# Patient Record
Sex: MALE | Race: WHITE | URBAN - METROPOLITAN AREA
[De-identification: names, ages, dates, MRNs, and addresses within clinical notes are randomized per-mention and may not be internally consistent; named-entity substitution may affect disease eponyms.]

---

## 2017-01-01 ENCOUNTER — APPOINTMENT (OUTPATIENT)
Dept: CT IMAGING | Facility: CLINIC | Age: 47
End: 2017-01-01
Attending: EMERGENCY MEDICINE
Payer: COMMERCIAL

## 2017-01-01 ENCOUNTER — HOSPITAL ENCOUNTER (EMERGENCY)
Facility: CLINIC | Age: 47
Discharge: HOME | End: 2017-08-26
Attending: EMERGENCY MEDICINE | Admitting: EMERGENCY MEDICINE
Payer: COMMERCIAL

## 2017-01-01 ENCOUNTER — HOSPITAL ENCOUNTER (INPATIENT)
Facility: CLINIC | Age: 47
LOS: 2 days | Discharge: HOME | End: 2017-10-27
Attending: EMERGENCY MEDICINE | Admitting: INTERNAL MEDICINE
Payer: COMMERCIAL

## 2017-01-01 ENCOUNTER — OFFICE VISIT (OUTPATIENT)
Dept: INTERNAL MEDICINE | Facility: CLINIC | Age: 47
End: 2017-01-01
Payer: COMMERCIAL

## 2017-01-01 ENCOUNTER — TELEPHONE (OUTPATIENT)
Dept: INTERNAL MEDICINE | Facility: CLINIC | Age: 47
End: 2017-01-01

## 2017-01-01 VITALS
OXYGEN SATURATION: 97 % | HEART RATE: 106 BPM | TEMPERATURE: 97.3 F | DIASTOLIC BLOOD PRESSURE: 94 MMHG | SYSTOLIC BLOOD PRESSURE: 139 MMHG | RESPIRATION RATE: 16 BRPM

## 2017-01-01 VITALS
HEIGHT: 68 IN | OXYGEN SATURATION: 98 % | HEART RATE: 82 BPM | DIASTOLIC BLOOD PRESSURE: 70 MMHG | WEIGHT: 201 LBS | TEMPERATURE: 98.2 F | SYSTOLIC BLOOD PRESSURE: 118 MMHG | BODY MASS INDEX: 30.46 KG/M2

## 2017-01-01 VITALS
DIASTOLIC BLOOD PRESSURE: 76 MMHG | SYSTOLIC BLOOD PRESSURE: 124 MMHG | TEMPERATURE: 98.2 F | BODY MASS INDEX: 30.31 KG/M2 | OXYGEN SATURATION: 99 % | HEART RATE: 78 BPM | HEIGHT: 68 IN | WEIGHT: 200 LBS

## 2017-01-01 VITALS
BODY MASS INDEX: 27.6 KG/M2 | RESPIRATION RATE: 20 BRPM | DIASTOLIC BLOOD PRESSURE: 101 MMHG | TEMPERATURE: 98.4 F | HEIGHT: 68 IN | WEIGHT: 182.1 LBS | SYSTOLIC BLOOD PRESSURE: 160 MMHG | HEART RATE: 96 BPM | OXYGEN SATURATION: 97 %

## 2017-01-01 DIAGNOSIS — D69.6 THROMBOCYTOPENIA (H): ICD-10-CM

## 2017-01-01 DIAGNOSIS — C92.10 CHRONIC MYELOID LEUKEMIA (H): Primary | ICD-10-CM

## 2017-01-01 DIAGNOSIS — R56.9 SEIZURES (H): ICD-10-CM

## 2017-01-01 DIAGNOSIS — R11.2 NAUSEA AND VOMITING, INTRACTABILITY OF VOMITING NOT SPECIFIED, UNSPECIFIED VOMITING TYPE: ICD-10-CM

## 2017-01-01 DIAGNOSIS — Z00.00 ROUTINE HISTORY AND PHYSICAL EXAMINATION OF ADULT: Primary | ICD-10-CM

## 2017-01-01 DIAGNOSIS — E83.42 HYPOMAGNESEMIA: ICD-10-CM

## 2017-01-01 DIAGNOSIS — Z87.39 HISTORY OF GOUT: ICD-10-CM

## 2017-01-01 DIAGNOSIS — C92.10 CHRONIC MYELOID LEUKEMIA (H): ICD-10-CM

## 2017-01-01 DIAGNOSIS — L71.9 ROSACEA: ICD-10-CM

## 2017-01-01 DIAGNOSIS — G40.909 RECURRENT SEIZURES (H): ICD-10-CM

## 2017-01-01 DIAGNOSIS — R60.0 LEG EDEMA: ICD-10-CM

## 2017-01-01 DIAGNOSIS — E87.6 HYPOKALEMIA: ICD-10-CM

## 2017-01-01 DIAGNOSIS — F10.932 ALCOHOL WITHDRAWAL, WITH PERCEPTUAL DISTURBANCE (H): ICD-10-CM

## 2017-01-01 LAB
ALBUMIN SERPL-MCNC: 3.5 G/DL (ref 3.4–5)
ALBUMIN SERPL-MCNC: 3.9 G/DL (ref 3.4–5)
ALBUMIN UR-MCNC: 100 MG/DL
ALBUMIN UR-MCNC: NEGATIVE MG/DL
ALBUMIN UR-MCNC: NEGATIVE MG/DL
ALP SERPL-CCNC: 49 U/L (ref 40–150)
ALP SERPL-CCNC: 62 U/L (ref 40–150)
ALT SERPL W P-5'-P-CCNC: 25 U/L (ref 0–70)
ALT SERPL W P-5'-P-CCNC: 27 U/L (ref 0–70)
AMORPH CRY #/AREA URNS HPF: ABNORMAL /HPF
ANION GAP SERPL CALCULATED.3IONS-SCNC: 11 MMOL/L (ref 3–14)
ANION GAP SERPL CALCULATED.3IONS-SCNC: 15 MMOL/L (ref 3–14)
ANION GAP SERPL CALCULATED.3IONS-SCNC: 17 MMOL/L (ref 3–14)
ANION GAP SERPL CALCULATED.3IONS-SCNC: 6 MMOL/L (ref 3–14)
ANION GAP SERPL CALCULATED.3IONS-SCNC: 6 MMOL/L (ref 3–14)
ANION GAP SERPL CALCULATED.3IONS-SCNC: 8 MMOL/L (ref 3–14)
APPEARANCE UR: ABNORMAL
APPEARANCE UR: ABNORMAL
APPEARANCE UR: CLEAR
AST SERPL W P-5'-P-CCNC: 21 U/L (ref 0–45)
AST SERPL W P-5'-P-CCNC: 25 U/L (ref 0–45)
BASE EXCESS BLDV CALC-SCNC: 0.1 MMOL/L
BASOPHILS # BLD AUTO: 0 10E9/L (ref 0–0.2)
BASOPHILS # BLD AUTO: 0.1 10E9/L (ref 0–0.2)
BASOPHILS # BLD AUTO: 0.1 10E9/L (ref 0–0.2)
BASOPHILS NFR BLD AUTO: 0.4 %
BASOPHILS NFR BLD AUTO: 0.4 %
BASOPHILS NFR BLD AUTO: 0.6 %
BASOPHILS NFR BLD AUTO: 0.9 %
BASOPHILS NFR BLD AUTO: 1 %
BILIRUB SERPL-MCNC: 0.6 MG/DL (ref 0.2–1.3)
BILIRUB SERPL-MCNC: 2.2 MG/DL (ref 0.2–1.3)
BILIRUB UR QL STRIP: ABNORMAL
BILIRUB UR QL STRIP: NEGATIVE
BILIRUB UR QL STRIP: NEGATIVE
BUN SERPL-MCNC: 10 MG/DL (ref 7–30)
BUN SERPL-MCNC: 11 MG/DL (ref 7–30)
BUN SERPL-MCNC: 6 MG/DL (ref 7–30)
BUN SERPL-MCNC: 6 MG/DL (ref 7–30)
BUN SERPL-MCNC: 7 MG/DL (ref 7–30)
BUN SERPL-MCNC: 8 MG/DL (ref 7–30)
C DIFF TOX B STL QL: NEGATIVE
CALCIUM SERPL-MCNC: 8.1 MG/DL (ref 8.5–10.1)
CALCIUM SERPL-MCNC: 8.1 MG/DL (ref 8.5–10.1)
CALCIUM SERPL-MCNC: 8.6 MG/DL (ref 8.5–10.1)
CALCIUM SERPL-MCNC: 8.7 MG/DL (ref 8.5–10.1)
CALCIUM SERPL-MCNC: 8.7 MG/DL (ref 8.5–10.1)
CALCIUM SERPL-MCNC: 9.1 MG/DL (ref 8.5–10.1)
CHLORIDE SERPL-SCNC: 103 MMOL/L (ref 94–109)
CHLORIDE SERPL-SCNC: 105 MMOL/L (ref 94–109)
CHLORIDE SERPL-SCNC: 106 MMOL/L (ref 94–109)
CHLORIDE SERPL-SCNC: 107 MMOL/L (ref 94–109)
CHLORIDE SERPL-SCNC: 109 MMOL/L (ref 94–109)
CHLORIDE SERPL-SCNC: 96 MMOL/L (ref 94–109)
CHOLEST SERPL-MCNC: 175 MG/DL
CO2 SERPL-SCNC: 18 MMOL/L (ref 20–32)
CO2 SERPL-SCNC: 23 MMOL/L (ref 20–32)
CO2 SERPL-SCNC: 24 MMOL/L (ref 20–32)
CO2 SERPL-SCNC: 25 MMOL/L (ref 20–32)
CO2 SERPL-SCNC: 26 MMOL/L (ref 20–32)
CO2 SERPL-SCNC: 28 MMOL/L (ref 20–32)
COLOR UR AUTO: ABNORMAL
COLOR UR AUTO: YELLOW
COLOR UR AUTO: YELLOW
CREAT SERPL-MCNC: 0.61 MG/DL (ref 0.66–1.25)
CREAT SERPL-MCNC: 0.62 MG/DL (ref 0.66–1.25)
CREAT SERPL-MCNC: 0.71 MG/DL (ref 0.66–1.25)
CREAT SERPL-MCNC: 0.75 MG/DL (ref 0.66–1.25)
CREAT SERPL-MCNC: 0.82 MG/DL (ref 0.66–1.25)
CREAT SERPL-MCNC: 0.89 MG/DL (ref 0.66–1.25)
DIFFERENTIAL METHOD BLD: ABNORMAL
EOSINOPHIL # BLD AUTO: 0 10E9/L (ref 0–0.7)
EOSINOPHIL # BLD AUTO: 0 10E9/L (ref 0–0.7)
EOSINOPHIL # BLD AUTO: 0.1 10E9/L (ref 0–0.7)
EOSINOPHIL # BLD AUTO: 0.2 10E9/L (ref 0–0.7)
EOSINOPHIL # BLD AUTO: 0.2 10E9/L (ref 0–0.7)
EOSINOPHIL NFR BLD AUTO: 0 %
EOSINOPHIL NFR BLD AUTO: 0.3 %
EOSINOPHIL NFR BLD AUTO: 2.2 %
EOSINOPHIL NFR BLD AUTO: 4 %
EOSINOPHIL NFR BLD AUTO: 4.1 %
ERYTHROCYTE [DISTWIDTH] IN BLOOD BY AUTOMATED COUNT: 12.8 % (ref 10–15)
ERYTHROCYTE [DISTWIDTH] IN BLOOD BY AUTOMATED COUNT: 12.8 % (ref 10–15)
ERYTHROCYTE [DISTWIDTH] IN BLOOD BY AUTOMATED COUNT: 12.9 % (ref 10–15)
ERYTHROCYTE [DISTWIDTH] IN BLOOD BY AUTOMATED COUNT: 13 % (ref 10–15)
ERYTHROCYTE [DISTWIDTH] IN BLOOD BY AUTOMATED COUNT: 13.6 % (ref 10–15)
ETHANOL SERPL-MCNC: <0.01 G/DL
GFR SERPL CREATININE-BSD FRML MDRD: >90 ML/MIN/1.7M2
GLUCOSE BLDC GLUCOMTR-MCNC: 108 MG/DL (ref 70–99)
GLUCOSE BLDC GLUCOMTR-MCNC: 119 MG/DL (ref 70–99)
GLUCOSE BLDC GLUCOMTR-MCNC: 164 MG/DL (ref 70–99)
GLUCOSE BLDC GLUCOMTR-MCNC: 190 MG/DL (ref 70–99)
GLUCOSE BLDC GLUCOMTR-MCNC: 198 MG/DL (ref 70–99)
GLUCOSE SERPL-MCNC: 106 MG/DL (ref 70–99)
GLUCOSE SERPL-MCNC: 110 MG/DL (ref 70–99)
GLUCOSE SERPL-MCNC: 152 MG/DL (ref 70–99)
GLUCOSE SERPL-MCNC: 176 MG/DL (ref 70–99)
GLUCOSE SERPL-MCNC: 196 MG/DL (ref 70–99)
GLUCOSE SERPL-MCNC: 198 MG/DL (ref 70–99)
GLUCOSE UR STRIP-MCNC: 150 MG/DL
GLUCOSE UR STRIP-MCNC: NEGATIVE MG/DL
GLUCOSE UR STRIP-MCNC: NEGATIVE MG/DL
HCO3 BLDV-SCNC: 23 MMOL/L (ref 21–28)
HCT VFR BLD AUTO: 35.6 % (ref 40–53)
HCT VFR BLD AUTO: 36.7 % (ref 40–53)
HCT VFR BLD AUTO: 37 % (ref 40–53)
HCT VFR BLD AUTO: 39.2 % (ref 40–53)
HCT VFR BLD AUTO: 42.2 % (ref 40–53)
HDLC SERPL-MCNC: 38 MG/DL
HGB BLD-MCNC: 12.3 G/DL (ref 13.3–17.7)
HGB BLD-MCNC: 12.3 G/DL (ref 13.3–17.7)
HGB BLD-MCNC: 12.4 G/DL (ref 13.3–17.7)
HGB BLD-MCNC: 13.7 G/DL (ref 13.3–17.7)
HGB BLD-MCNC: 15 G/DL (ref 13.3–17.7)
HGB UR QL STRIP: ABNORMAL
HGB UR QL STRIP: ABNORMAL
HGB UR QL STRIP: NEGATIVE
HYALINE CASTS #/AREA URNS LPF: 25 /LPF (ref 0–2)
HYALINE CASTS #/AREA URNS LPF: 4 /LPF (ref 0–2)
IMM GRANULOCYTES # BLD: 0 10E9/L (ref 0–0.4)
IMM GRANULOCYTES # BLD: 0.1 10E9/L (ref 0–0.4)
IMM GRANULOCYTES NFR BLD: 0.2 %
IMM GRANULOCYTES NFR BLD: 0.3 %
IMM GRANULOCYTES NFR BLD: 0.3 %
IMM GRANULOCYTES NFR BLD: 1.1 %
INR PPP: 1.28 (ref 0.86–1.14)
INTERPRETATION ECG - MUSE: NORMAL
KETONES UR STRIP-MCNC: 20 MG/DL
KETONES UR STRIP-MCNC: 20 MG/DL
KETONES UR STRIP-MCNC: NEGATIVE MG/DL
LACTATE BLD-SCNC: 1.5 MMOL/L (ref 0.7–2)
LACTATE BLD-SCNC: 4 MMOL/L (ref 0.7–2)
LACTATE BLD-SCNC: 6.4 MMOL/L (ref 0.7–2)
LDLC SERPL CALC-MCNC: 106 MG/DL
LEUKOCYTE ESTERASE UR QL STRIP: NEGATIVE
LEVETIRACETAM SERPL-MCNC: 8 UG/ML (ref 12–46)
LEVETIRACETAM SERPL-MCNC: <2 UG/ML (ref 12–46)
LYMPHOCYTES # BLD AUTO: 0.2 10E9/L (ref 0.8–5.3)
LYMPHOCYTES # BLD AUTO: 0.7 10E9/L (ref 0.8–5.3)
LYMPHOCYTES # BLD AUTO: 0.8 10E9/L (ref 0.8–5.3)
LYMPHOCYTES # BLD AUTO: 1 10E9/L (ref 0.8–5.3)
LYMPHOCYTES # BLD AUTO: 1.4 10E9/L (ref 0.8–5.3)
LYMPHOCYTES NFR BLD AUTO: 1.5 %
LYMPHOCYTES NFR BLD AUTO: 10.6 %
LYMPHOCYTES NFR BLD AUTO: 15.8 %
LYMPHOCYTES NFR BLD AUTO: 17.5 %
LYMPHOCYTES NFR BLD AUTO: 21 %
MAGNESIUM SERPL-MCNC: 1.4 MG/DL (ref 1.6–2.3)
MAGNESIUM SERPL-MCNC: 1.7 MG/DL (ref 1.6–2.3)
MAGNESIUM SERPL-MCNC: 3.2 MG/DL (ref 1.6–2.3)
MCH RBC QN AUTO: 35.1 PG (ref 26.5–33)
MCH RBC QN AUTO: 35.3 PG (ref 26.5–33)
MCH RBC QN AUTO: 35.4 PG (ref 26.5–33)
MCH RBC QN AUTO: 35.5 PG (ref 26.5–33)
MCH RBC QN AUTO: 35.7 PG (ref 26.5–33)
MCHC RBC AUTO-ENTMCNC: 33.5 G/DL (ref 31.5–36.5)
MCHC RBC AUTO-ENTMCNC: 33.5 G/DL (ref 31.5–36.5)
MCHC RBC AUTO-ENTMCNC: 34.6 G/DL (ref 31.5–36.5)
MCHC RBC AUTO-ENTMCNC: 34.9 G/DL (ref 31.5–36.5)
MCHC RBC AUTO-ENTMCNC: 35.5 G/DL (ref 31.5–36.5)
MCV RBC AUTO: 100 FL (ref 78–100)
MCV RBC AUTO: 101 FL (ref 78–100)
MCV RBC AUTO: 103 FL (ref 78–100)
MCV RBC AUTO: 105 FL (ref 78–100)
MCV RBC AUTO: 107 FL (ref 78–100)
MONOCYTES # BLD AUTO: 0.3 10E9/L (ref 0–1.3)
MONOCYTES # BLD AUTO: 0.5 10E9/L (ref 0–1.3)
MONOCYTES # BLD AUTO: 0.5 10E9/L (ref 0–1.3)
MONOCYTES # BLD AUTO: 0.6 10E9/L (ref 0–1.3)
MONOCYTES # BLD AUTO: 1.3 10E9/L (ref 0–1.3)
MONOCYTES NFR BLD AUTO: 11.2 %
MONOCYTES NFR BLD AUTO: 4.6 %
MONOCYTES NFR BLD AUTO: 8 %
MONOCYTES NFR BLD AUTO: 9.8 %
MONOCYTES NFR BLD AUTO: 9.8 %
MRSA DNA SPEC QL NAA+PROBE: NEGATIVE
MUCOUS THREADS #/AREA URNS LPF: PRESENT /LPF
MUCOUS THREADS #/AREA URNS LPF: PRESENT /LPF
NEUTROPHILS # BLD AUTO: 10 10E9/L (ref 1.6–8.3)
NEUTROPHILS # BLD AUTO: 10 10E9/L (ref 1.6–8.3)
NEUTROPHILS # BLD AUTO: 2.5 10E9/L (ref 1.6–8.3)
NEUTROPHILS # BLD AUTO: 3.3 10E9/L (ref 1.6–8.3)
NEUTROPHILS # BLD AUTO: 3.7 10E9/L (ref 1.6–8.3)
NEUTROPHILS NFR BLD AUTO: 66.4 %
NEUTROPHILS NFR BLD AUTO: 67.9 %
NEUTROPHILS NFR BLD AUTO: 69.7 %
NEUTROPHILS NFR BLD AUTO: 77.2 %
NEUTROPHILS NFR BLD AUTO: 93.2 %
NITRATE UR QL: NEGATIVE
NONHDLC SERPL-MCNC: 137 MG/DL
NRBC # BLD AUTO: 0 10*3/UL
NRBC BLD AUTO-RTO: 0 /100
O2/TOTAL GAS SETTING VFR VENT: ABNORMAL %
OXYHGB MFR BLDV: 87 %
PCO2 BLDV: 33 MM HG (ref 40–50)
PH BLDV: 7.46 PH (ref 7.32–7.43)
PH UR STRIP: 5 PH (ref 5–7)
PH UR STRIP: 6 PH (ref 5–7)
PH UR STRIP: 6.5 PH (ref 5–7)
PLATELET # BLD AUTO: 120 10E9/L (ref 150–450)
PLATELET # BLD AUTO: 167 10E9/L (ref 150–450)
PLATELET # BLD AUTO: 194 10E9/L (ref 150–450)
PLATELET # BLD AUTO: 86 10E9/L (ref 150–450)
PLATELET # BLD AUTO: 90 10E9/L (ref 150–450)
PO2 BLDV: 51 MM HG (ref 25–47)
POTASSIUM SERPL-SCNC: 2.7 MMOL/L (ref 3.4–5.3)
POTASSIUM SERPL-SCNC: 2.9 MMOL/L (ref 3.4–5.3)
POTASSIUM SERPL-SCNC: 3.4 MMOL/L (ref 3.4–5.3)
POTASSIUM SERPL-SCNC: 3.6 MMOL/L (ref 3.4–5.3)
POTASSIUM SERPL-SCNC: 3.7 MMOL/L (ref 3.4–5.3)
POTASSIUM SERPL-SCNC: 3.8 MMOL/L (ref 3.4–5.3)
POTASSIUM SERPL-SCNC: 3.9 MMOL/L (ref 3.4–5.3)
PROT SERPL-MCNC: 6.8 G/DL (ref 6.8–8.8)
PROT SERPL-MCNC: 7.1 G/DL (ref 6.8–8.8)
PSA SERPL-ACNC: 0.78 UG/L (ref 0–4)
RBC # BLD AUTO: 3.46 10E12/L (ref 4.4–5.9)
RBC # BLD AUTO: 3.47 10E12/L (ref 4.4–5.9)
RBC # BLD AUTO: 3.5 10E12/L (ref 4.4–5.9)
RBC # BLD AUTO: 3.88 10E12/L (ref 4.4–5.9)
RBC # BLD AUTO: 4.24 10E12/L (ref 4.4–5.9)
RBC #/AREA URNS AUTO: 1 /HPF (ref 0–2)
RBC #/AREA URNS AUTO: 20 /HPF (ref 0–2)
RBC #/AREA URNS AUTO: NORMAL /HPF
SODIUM SERPL-SCNC: 135 MMOL/L (ref 133–144)
SODIUM SERPL-SCNC: 135 MMOL/L (ref 133–144)
SODIUM SERPL-SCNC: 139 MMOL/L (ref 133–144)
SODIUM SERPL-SCNC: 140 MMOL/L (ref 133–144)
SODIUM SERPL-SCNC: 140 MMOL/L (ref 133–144)
SODIUM SERPL-SCNC: 144 MMOL/L (ref 133–144)
SOURCE: ABNORMAL
SOURCE: ABNORMAL
SOURCE: NORMAL
SP GR UR STRIP: 1.01 (ref 1–1.03)
SP GR UR STRIP: 1.03 (ref 1–1.03)
SP GR UR STRIP: <=1.005 (ref 1–1.03)
SPECIMEN SOURCE: NORMAL
SPECIMEN SOURCE: NORMAL
SPERM #/AREA URNS HPF: PRESENT /HPF
SQUAMOUS #/AREA URNS AUTO: 1 /HPF (ref 0–1)
SQUAMOUS #/AREA URNS AUTO: 1 /HPF (ref 0–1)
TRIGL SERPL-MCNC: 156 MG/DL
TROPONIN I SERPL-MCNC: <0.015 UG/L (ref 0–0.04)
URATE SERPL-MCNC: 7.6 MG/DL (ref 3.5–7.2)
UROBILINOGEN UR STRIP-ACNC: 0.2 EU/DL (ref 0.2–1)
UROBILINOGEN UR STRIP-MCNC: 0 MG/DL (ref 0–2)
UROBILINOGEN UR STRIP-MCNC: 2 MG/DL (ref 0–2)
WBC # BLD AUTO: 10.8 10E9/L (ref 4–11)
WBC # BLD AUTO: 13 10E9/L (ref 4–11)
WBC # BLD AUTO: 3.6 10E9/L (ref 4–11)
WBC # BLD AUTO: 4.7 10E9/L (ref 4–11)
WBC # BLD AUTO: 5.5 10E9/L (ref 4–11)
WBC #/AREA URNS AUTO: 12 /HPF (ref 0–2)
WBC #/AREA URNS AUTO: 2 /HPF (ref 0–2)
WBC #/AREA URNS AUTO: NORMAL /HPF

## 2017-01-01 PROCEDURE — 93005 ELECTROCARDIOGRAM TRACING: CPT

## 2017-01-01 PROCEDURE — 96368 THER/DIAG CONCURRENT INF: CPT

## 2017-01-01 PROCEDURE — 25000128 H RX IP 250 OP 636: Performed by: EMERGENCY MEDICINE

## 2017-01-01 PROCEDURE — 96365 THER/PROPH/DIAG IV INF INIT: CPT

## 2017-01-01 PROCEDURE — 85025 COMPLETE CBC W/AUTO DIFF WBC: CPT | Performed by: FAMILY MEDICINE

## 2017-01-01 PROCEDURE — 80048 BASIC METABOLIC PNL TOTAL CA: CPT | Performed by: INTERNAL MEDICINE

## 2017-01-01 PROCEDURE — 80320 DRUG SCREEN QUANTALCOHOLS: CPT | Performed by: EMERGENCY MEDICINE

## 2017-01-01 PROCEDURE — 25000132 ZZH RX MED GY IP 250 OP 250 PS 637: Performed by: EMERGENCY MEDICINE

## 2017-01-01 PROCEDURE — 96375 TX/PRO/DX INJ NEW DRUG ADDON: CPT

## 2017-01-01 PROCEDURE — 99207 ZZC DOWN CODE DUE TO INITIAL EXAM: CPT | Performed by: INTERNAL MEDICINE

## 2017-01-01 PROCEDURE — 81001 URINALYSIS AUTO W/SCOPE: CPT | Performed by: EMERGENCY MEDICINE

## 2017-01-01 PROCEDURE — 36415 COLL VENOUS BLD VENIPUNCTURE: CPT | Performed by: INTERNAL MEDICINE

## 2017-01-01 PROCEDURE — 80053 COMPREHEN METABOLIC PANEL: CPT | Performed by: EMERGENCY MEDICINE

## 2017-01-01 PROCEDURE — 80177 DRUG SCRN QUAN LEVETIRACETAM: CPT | Performed by: INTERNAL MEDICINE

## 2017-01-01 PROCEDURE — 96361 HYDRATE IV INFUSION ADD-ON: CPT

## 2017-01-01 PROCEDURE — 99221 1ST HOSP IP/OBS SF/LOW 40: CPT | Performed by: INTERNAL MEDICINE

## 2017-01-01 PROCEDURE — 80053 COMPREHEN METABOLIC PANEL: CPT | Performed by: FAMILY MEDICINE

## 2017-01-01 PROCEDURE — 25000125 ZZHC RX 250: Performed by: EMERGENCY MEDICINE

## 2017-01-01 PROCEDURE — 70450 CT HEAD/BRAIN W/O DYE: CPT

## 2017-01-01 PROCEDURE — 83735 ASSAY OF MAGNESIUM: CPT | Performed by: EMERGENCY MEDICINE

## 2017-01-01 PROCEDURE — 83735 ASSAY OF MAGNESIUM: CPT | Performed by: INTERNAL MEDICINE

## 2017-01-01 PROCEDURE — 20000003 ZZH R&B ICU

## 2017-01-01 PROCEDURE — 99214 OFFICE O/P EST MOD 30 MIN: CPT | Performed by: NURSE PRACTITIONER

## 2017-01-01 PROCEDURE — 85025 COMPLETE CBC W/AUTO DIFF WBC: CPT | Performed by: INTERNAL MEDICINE

## 2017-01-01 PROCEDURE — 99239 HOSP IP/OBS DSCHRG MGMT >30: CPT | Performed by: INTERNAL MEDICINE

## 2017-01-01 PROCEDURE — 99396 PREV VISIT EST AGE 40-64: CPT | Performed by: FAMILY MEDICINE

## 2017-01-01 PROCEDURE — 96376 TX/PRO/DX INJ SAME DRUG ADON: CPT

## 2017-01-01 PROCEDURE — 85610 PROTHROMBIN TIME: CPT | Performed by: EMERGENCY MEDICINE

## 2017-01-01 PROCEDURE — 99207 ZZC CDG-MDM COMPONENT: MEETS LOW - DOWN CODED: CPT | Performed by: INTERNAL MEDICINE

## 2017-01-01 PROCEDURE — 87641 MR-STAPH DNA AMP PROBE: CPT | Performed by: INTERNAL MEDICINE

## 2017-01-01 PROCEDURE — 25000128 H RX IP 250 OP 636: Performed by: INTERNAL MEDICINE

## 2017-01-01 PROCEDURE — 85025 COMPLETE CBC W/AUTO DIFF WBC: CPT | Performed by: EMERGENCY MEDICINE

## 2017-01-01 PROCEDURE — 80048 BASIC METABOLIC PNL TOTAL CA: CPT | Performed by: EMERGENCY MEDICINE

## 2017-01-01 PROCEDURE — 25000132 ZZH RX MED GY IP 250 OP 250 PS 637: Performed by: INTERNAL MEDICINE

## 2017-01-01 PROCEDURE — 25000128 H RX IP 250 OP 636

## 2017-01-01 PROCEDURE — 82805 BLOOD GASES W/O2 SATURATION: CPT | Performed by: INTERNAL MEDICINE

## 2017-01-01 PROCEDURE — 12000007 ZZH R&B INTERMEDIATE

## 2017-01-01 PROCEDURE — 00000146 ZZHCL STATISTIC GLUCOSE BY METER IP

## 2017-01-01 PROCEDURE — 99285 EMERGENCY DEPT VISIT HI MDM: CPT | Mod: 25

## 2017-01-01 PROCEDURE — 80177 DRUG SCRN QUAN LEVETIRACETAM: CPT | Performed by: EMERGENCY MEDICINE

## 2017-01-01 PROCEDURE — 96366 THER/PROPH/DIAG IV INF ADDON: CPT

## 2017-01-01 PROCEDURE — 99232 SBSQ HOSP IP/OBS MODERATE 35: CPT | Performed by: INTERNAL MEDICINE

## 2017-01-01 PROCEDURE — 83605 ASSAY OF LACTIC ACID: CPT | Performed by: INTERNAL MEDICINE

## 2017-01-01 PROCEDURE — 87493 C DIFF AMPLIFIED PROBE: CPT | Performed by: INTERNAL MEDICINE

## 2017-01-01 PROCEDURE — 84484 ASSAY OF TROPONIN QUANT: CPT | Performed by: EMERGENCY MEDICINE

## 2017-01-01 PROCEDURE — 84132 ASSAY OF SERUM POTASSIUM: CPT | Performed by: INTERNAL MEDICINE

## 2017-01-01 PROCEDURE — G0103 PSA SCREENING: HCPCS | Performed by: FAMILY MEDICINE

## 2017-01-01 PROCEDURE — 36415 COLL VENOUS BLD VENIPUNCTURE: CPT | Performed by: FAMILY MEDICINE

## 2017-01-01 PROCEDURE — 84550 ASSAY OF BLOOD/URIC ACID: CPT | Performed by: FAMILY MEDICINE

## 2017-01-01 PROCEDURE — 83605 ASSAY OF LACTIC ACID: CPT | Performed by: EMERGENCY MEDICINE

## 2017-01-01 PROCEDURE — S0088 IMATINIB 100 MG: HCPCS | Performed by: INTERNAL MEDICINE

## 2017-01-01 PROCEDURE — 87640 STAPH A DNA AMP PROBE: CPT | Performed by: INTERNAL MEDICINE

## 2017-01-01 PROCEDURE — 80061 LIPID PANEL: CPT | Performed by: FAMILY MEDICINE

## 2017-01-01 PROCEDURE — 81001 URINALYSIS AUTO W/SCOPE: CPT | Performed by: FAMILY MEDICINE

## 2017-01-01 RX ORDER — MULTIPLE VITAMINS W/ MINERALS TAB 9MG-400MCG
1 TAB ORAL DAILY
Status: DISCONTINUED | OUTPATIENT
Start: 2017-01-01 | End: 2017-01-01 | Stop reason: HOSPADM

## 2017-01-01 RX ORDER — METRONIDAZOLE 7.5 MG/G
1 LOTION TOPICAL 2 TIMES DAILY
Qty: 59 ML | Refills: 1 | Status: SHIPPED | OUTPATIENT
Start: 2017-01-01 | End: 2017-01-01

## 2017-01-01 RX ORDER — POLYETHYLENE GLYCOL 3350 17 G/17G
17 POWDER, FOR SOLUTION ORAL DAILY PRN
Status: DISCONTINUED | OUTPATIENT
Start: 2017-01-01 | End: 2017-01-01 | Stop reason: HOSPADM

## 2017-01-01 RX ORDER — AMOXICILLIN 250 MG
1-2 CAPSULE ORAL 2 TIMES DAILY
Status: DISCONTINUED | OUTPATIENT
Start: 2017-01-01 | End: 2017-01-01 | Stop reason: HOSPADM

## 2017-01-01 RX ORDER — PROCHLORPERAZINE 25 MG
25 SUPPOSITORY, RECTAL RECTAL EVERY 12 HOURS PRN
Status: DISCONTINUED | OUTPATIENT
Start: 2017-01-01 | End: 2017-01-01 | Stop reason: HOSPADM

## 2017-01-01 RX ORDER — LANOLIN ALCOHOL/MO/W.PET/CERES
100 CREAM (GRAM) TOPICAL DAILY
Status: DISCONTINUED | OUTPATIENT
Start: 2017-01-01 | End: 2017-01-01 | Stop reason: HOSPADM

## 2017-01-01 RX ORDER — POTASSIUM CL/LIDO/0.9 % NACL 10MEQ/0.1L
10 INTRAVENOUS SOLUTION, PIGGYBACK (ML) INTRAVENOUS ONCE
Status: COMPLETED | OUTPATIENT
Start: 2017-01-01 | End: 2017-01-01

## 2017-01-01 RX ORDER — LORAZEPAM 2 MG/ML
1-2 INJECTION INTRAMUSCULAR EVERY 30 MIN PRN
Status: DISCONTINUED | OUTPATIENT
Start: 2017-01-01 | End: 2017-01-01 | Stop reason: HOSPADM

## 2017-01-01 RX ORDER — POTASSIUM CHLORIDE 7.45 MG/ML
10 INJECTION INTRAVENOUS
Status: DISCONTINUED | OUTPATIENT
Start: 2017-01-01 | End: 2017-01-01 | Stop reason: HOSPADM

## 2017-01-01 RX ORDER — ONDANSETRON 2 MG/ML
4 INJECTION INTRAMUSCULAR; INTRAVENOUS ONCE
Status: COMPLETED | OUTPATIENT
Start: 2017-01-01 | End: 2017-01-01

## 2017-01-01 RX ORDER — POTASSIUM CHLORIDE 29.8 MG/ML
20 INJECTION INTRAVENOUS
Status: DISCONTINUED | OUTPATIENT
Start: 2017-01-01 | End: 2017-01-01 | Stop reason: HOSPADM

## 2017-01-01 RX ORDER — POTASSIUM CHLORIDE 1500 MG/1
40 TABLET, EXTENDED RELEASE ORAL ONCE
Status: COMPLETED | OUTPATIENT
Start: 2017-01-01 | End: 2017-01-01

## 2017-01-01 RX ORDER — MAGNESIUM SULFATE HEPTAHYDRATE 40 MG/ML
4 INJECTION, SOLUTION INTRAVENOUS EVERY 4 HOURS PRN
Status: DISCONTINUED | OUTPATIENT
Start: 2017-01-01 | End: 2017-01-01 | Stop reason: HOSPADM

## 2017-01-01 RX ORDER — SODIUM CHLORIDE 9 MG/ML
1000 INJECTION, SOLUTION INTRAVENOUS CONTINUOUS
Status: DISCONTINUED | OUTPATIENT
Start: 2017-01-01 | End: 2017-01-01 | Stop reason: HOSPADM

## 2017-01-01 RX ORDER — LANOLIN ALCOHOL/MO/W.PET/CERES
100 CREAM (GRAM) TOPICAL ONCE
Status: COMPLETED | OUTPATIENT
Start: 2017-01-01 | End: 2017-01-01

## 2017-01-01 RX ORDER — ZOLPIDEM TARTRATE 5 MG/1
5 TABLET ORAL
Status: DISCONTINUED | OUTPATIENT
Start: 2017-01-01 | End: 2017-01-01 | Stop reason: HOSPADM

## 2017-01-01 RX ORDER — LORAZEPAM 2 MG/ML
1 INJECTION INTRAMUSCULAR ONCE
Status: COMPLETED | OUTPATIENT
Start: 2017-01-01 | End: 2017-01-01

## 2017-01-01 RX ORDER — LORAZEPAM 1 MG/1
1 TABLET ORAL EVERY 8 HOURS
Status: DISCONTINUED | OUTPATIENT
Start: 2017-01-01 | End: 2017-01-01

## 2017-01-01 RX ORDER — ONDANSETRON 2 MG/ML
INJECTION INTRAMUSCULAR; INTRAVENOUS
Status: COMPLETED
Start: 2017-01-01 | End: 2017-01-01

## 2017-01-01 RX ORDER — ONDANSETRON 2 MG/ML
4 INJECTION INTRAMUSCULAR; INTRAVENOUS EVERY 6 HOURS PRN
Status: DISCONTINUED | OUTPATIENT
Start: 2017-01-01 | End: 2017-01-01 | Stop reason: HOSPADM

## 2017-01-01 RX ORDER — FOLIC ACID 1 MG/1
1 TABLET ORAL DAILY
Status: DISCONTINUED | OUTPATIENT
Start: 2017-01-01 | End: 2017-01-01 | Stop reason: HOSPADM

## 2017-01-01 RX ORDER — IMATINIB MESYLATE 400 MG/1
400 TABLET, FILM COATED ORAL DAILY
Status: DISCONTINUED | OUTPATIENT
Start: 2017-01-01 | End: 2017-01-01 | Stop reason: HOSPADM

## 2017-01-01 RX ORDER — LEVETIRACETAM 500 MG/1
500 TABLET ORAL 2 TIMES DAILY
Status: DISCONTINUED | OUTPATIENT
Start: 2017-01-01 | End: 2017-01-01 | Stop reason: HOSPADM

## 2017-01-01 RX ORDER — ONDANSETRON 4 MG/1
4 TABLET, ORALLY DISINTEGRATING ORAL EVERY 6 HOURS PRN
Status: DISCONTINUED | OUTPATIENT
Start: 2017-01-01 | End: 2017-01-01 | Stop reason: HOSPADM

## 2017-01-01 RX ORDER — HYDROMORPHONE HYDROCHLORIDE 1 MG/ML
.3-.5 INJECTION, SOLUTION INTRAMUSCULAR; INTRAVENOUS; SUBCUTANEOUS
Status: DISCONTINUED | OUTPATIENT
Start: 2017-01-01 | End: 2017-01-01 | Stop reason: HOSPADM

## 2017-01-01 RX ORDER — POTASSIUM CHLORIDE 1.5 G/1.58G
20-40 POWDER, FOR SOLUTION ORAL
Status: DISCONTINUED | OUTPATIENT
Start: 2017-01-01 | End: 2017-01-01 | Stop reason: HOSPADM

## 2017-01-01 RX ORDER — POTASSIUM CHLORIDE 1500 MG/1
20 TABLET, EXTENDED RELEASE ORAL DAILY
Qty: 3 TABLET | Refills: 0 | Status: SHIPPED | OUTPATIENT
Start: 2017-01-01 | End: 2017-01-01

## 2017-01-01 RX ORDER — SODIUM CHLORIDE, SODIUM LACTATE, POTASSIUM CHLORIDE, CALCIUM CHLORIDE 600; 310; 30; 20 MG/100ML; MG/100ML; MG/100ML; MG/100ML
INJECTION, SOLUTION INTRAVENOUS CONTINUOUS
Status: DISCONTINUED | OUTPATIENT
Start: 2017-01-01 | End: 2017-01-01

## 2017-01-01 RX ORDER — MULTIPLE VITAMINS W/ MINERALS TAB 9MG-400MCG
1 TAB ORAL ONCE
Status: COMPLETED | OUTPATIENT
Start: 2017-01-01 | End: 2017-01-01

## 2017-01-01 RX ORDER — POTASSIUM CHLORIDE 1500 MG/1
20-40 TABLET, EXTENDED RELEASE ORAL
Status: DISCONTINUED | OUTPATIENT
Start: 2017-01-01 | End: 2017-01-01 | Stop reason: HOSPADM

## 2017-01-01 RX ORDER — POTASSIUM CL/LIDO/0.9 % NACL 10MEQ/0.1L
10 INTRAVENOUS SOLUTION, PIGGYBACK (ML) INTRAVENOUS
Status: DISCONTINUED | OUTPATIENT
Start: 2017-01-01 | End: 2017-01-01 | Stop reason: HOSPADM

## 2017-01-01 RX ORDER — ACETAMINOPHEN 325 MG/1
650 TABLET ORAL EVERY 4 HOURS PRN
Status: DISCONTINUED | OUTPATIENT
Start: 2017-01-01 | End: 2017-01-01 | Stop reason: HOSPADM

## 2017-01-01 RX ORDER — LORAZEPAM 1 MG/1
1-2 TABLET ORAL EVERY 30 MIN PRN
Status: DISCONTINUED | OUTPATIENT
Start: 2017-01-01 | End: 2017-01-01 | Stop reason: HOSPADM

## 2017-01-01 RX ORDER — NALOXONE HYDROCHLORIDE 0.4 MG/ML
.1-.4 INJECTION, SOLUTION INTRAMUSCULAR; INTRAVENOUS; SUBCUTANEOUS
Status: DISCONTINUED | OUTPATIENT
Start: 2017-01-01 | End: 2017-01-01 | Stop reason: HOSPADM

## 2017-01-01 RX ORDER — LORAZEPAM 2 MG/ML
INJECTION INTRAMUSCULAR
Status: COMPLETED
Start: 2017-01-01 | End: 2017-01-01

## 2017-01-01 RX ORDER — LEVETIRACETAM 500 MG/1
500 TABLET ORAL 2 TIMES DAILY
Qty: 60 TABLET | Refills: 0 | Status: SHIPPED | OUTPATIENT
Start: 2017-01-01

## 2017-01-01 RX ORDER — IMATINIB MESYLATE 400 MG/1
400 TABLET, FILM COATED ORAL DAILY
COMMUNITY
Start: 2017-01-01

## 2017-01-01 RX ORDER — PROCHLORPERAZINE MALEATE 5 MG
5-10 TABLET ORAL EVERY 6 HOURS PRN
Status: DISCONTINUED | OUTPATIENT
Start: 2017-01-01 | End: 2017-01-01 | Stop reason: HOSPADM

## 2017-01-01 RX ADMIN — ACETAMINOPHEN 650 MG: 325 TABLET, FILM COATED ORAL at 15:27

## 2017-01-01 RX ADMIN — LORAZEPAM 1 MG: 1 TABLET ORAL at 20:45

## 2017-01-01 RX ADMIN — METRONIDAZOLE: 7.5 CREAM TOPICAL at 15:26

## 2017-01-01 RX ADMIN — LEVETIRACETAM 1000 MG: 100 INJECTION, SOLUTION INTRAVENOUS at 14:45

## 2017-01-01 RX ADMIN — SODIUM CHLORIDE 1000 ML: 9 INJECTION, SOLUTION INTRAVENOUS at 03:45

## 2017-01-01 RX ADMIN — METRONIDAZOLE: 7.5 CREAM TOPICAL at 20:47

## 2017-01-01 RX ADMIN — SODIUM CHLORIDE, POTASSIUM CHLORIDE, SODIUM LACTATE AND CALCIUM CHLORIDE 1000 ML: 600; 310; 30; 20 INJECTION, SOLUTION INTRAVENOUS at 06:22

## 2017-01-01 RX ADMIN — LORAZEPAM 1 MG: 1 TABLET ORAL at 07:53

## 2017-01-01 RX ADMIN — MULTIPLE VITAMINS W/ MINERALS TAB 1 TABLET: TAB at 07:53

## 2017-01-01 RX ADMIN — Medication 100 MG: at 07:53

## 2017-01-01 RX ADMIN — LORAZEPAM 1 MG: 2 INJECTION INTRAMUSCULAR at 02:35

## 2017-01-01 RX ADMIN — Medication 100 MG: at 02:53

## 2017-01-01 RX ADMIN — ONDANSETRON 4 MG: 2 INJECTION INTRAMUSCULAR; INTRAVENOUS at 02:37

## 2017-01-01 RX ADMIN — SODIUM CHLORIDE 1000 ML: 9 INJECTION, SOLUTION INTRAVENOUS at 02:39

## 2017-01-01 RX ADMIN — FOLIC ACID 1 MG: 1 TABLET ORAL at 07:53

## 2017-01-01 RX ADMIN — METRONIDAZOLE: 7.5 CREAM TOPICAL at 08:18

## 2017-01-01 RX ADMIN — SODIUM CHLORIDE 1000 ML: 9 INJECTION, SOLUTION INTRAVENOUS at 12:19

## 2017-01-01 RX ADMIN — LORAZEPAM 1 MG: 1 TABLET ORAL at 04:57

## 2017-01-01 RX ADMIN — LORAZEPAM 2 MG: 1 TABLET ORAL at 10:42

## 2017-01-01 RX ADMIN — LEVETIRACETAM 500 MG: 500 TABLET ORAL at 09:02

## 2017-01-01 RX ADMIN — LEVETIRACETAM 500 MG: 500 TABLET ORAL at 21:27

## 2017-01-01 RX ADMIN — SODIUM CHLORIDE, POTASSIUM CHLORIDE, SODIUM LACTATE AND CALCIUM CHLORIDE: 600; 310; 30; 20 INJECTION, SOLUTION INTRAVENOUS at 23:40

## 2017-01-01 RX ADMIN — FOLIC ACID 1 MG: 1 TABLET ORAL at 09:02

## 2017-01-01 RX ADMIN — IMATINIB MESYLATE 400 MG: 400 TABLET, FILM COATED ORAL at 20:36

## 2017-01-01 RX ADMIN — MAGNESIUM SULFATE IN WATER 4 G: 40 INJECTION, SOLUTION INTRAVENOUS at 07:56

## 2017-01-01 RX ADMIN — MULTIPLE VITAMINS W/ MINERALS TAB 1 TABLET: TAB at 09:04

## 2017-01-01 RX ADMIN — Medication 2 G: at 03:58

## 2017-01-01 RX ADMIN — LORAZEPAM 1 MG: 1 TABLET ORAL at 12:51

## 2017-01-01 RX ADMIN — LORAZEPAM 1 MG: 1 TABLET ORAL at 15:24

## 2017-01-01 RX ADMIN — SODIUM CHLORIDE, POTASSIUM CHLORIDE, SODIUM LACTATE AND CALCIUM CHLORIDE: 600; 310; 30; 20 INJECTION, SOLUTION INTRAVENOUS at 06:34

## 2017-01-01 RX ADMIN — LEVETIRACETAM 500 MG: 500 TABLET ORAL at 20:45

## 2017-01-01 RX ADMIN — LORAZEPAM 1 MG: 2 INJECTION, SOLUTION INTRAMUSCULAR; INTRAVENOUS at 02:35

## 2017-01-01 RX ADMIN — METRONIDAZOLE: 7.5 CREAM TOPICAL at 09:03

## 2017-01-01 RX ADMIN — Medication 100 MG: at 09:02

## 2017-01-01 RX ADMIN — MULTIPLE VITAMINS W/ MINERALS TAB 1 TABLET: TAB at 02:53

## 2017-01-01 RX ADMIN — LORAZEPAM 1 MG: 1 TABLET ORAL at 06:20

## 2017-01-01 RX ADMIN — SODIUM CHLORIDE, POTASSIUM CHLORIDE, SODIUM LACTATE AND CALCIUM CHLORIDE 150 ML/HR: 600; 310; 30; 20 INJECTION, SOLUTION INTRAVENOUS at 16:53

## 2017-01-01 RX ADMIN — Medication 10 MEQ: at 13:41

## 2017-01-01 RX ADMIN — MULTIPLE VITAMINS W/ MINERALS TAB 1 TABLET: TAB at 09:02

## 2017-01-01 RX ADMIN — POTASSIUM CHLORIDE 40 MEQ: 1.5 POWDER, FOR SOLUTION ORAL at 09:04

## 2017-01-01 RX ADMIN — ACETAMINOPHEN 650 MG: 325 TABLET, FILM COATED ORAL at 20:02

## 2017-01-01 RX ADMIN — FOLIC ACID 1 MG: 1 TABLET ORAL at 09:04

## 2017-01-01 RX ADMIN — Medication 100 MG: at 09:04

## 2017-01-01 RX ADMIN — LEVETIRACETAM 500 MG: 500 TABLET ORAL at 09:04

## 2017-01-01 RX ADMIN — SODIUM CHLORIDE, POTASSIUM CHLORIDE, SODIUM LACTATE AND CALCIUM CHLORIDE: 600; 310; 30; 20 INJECTION, SOLUTION INTRAVENOUS at 07:27

## 2017-01-01 RX ADMIN — IMATINIB MESYLATE 400 MG: 400 TABLET, FILM COATED ORAL at 20:42

## 2017-01-01 RX ADMIN — LEVETIRACETAM 1000 MG: 100 INJECTION, SOLUTION, CONCENTRATE INTRAVENOUS at 06:25

## 2017-01-01 RX ADMIN — POTASSIUM CHLORIDE 40 MEQ: 1500 TABLET, EXTENDED RELEASE ORAL at 13:41

## 2017-01-01 RX ADMIN — METRONIDAZOLE: 7.5 CREAM TOPICAL at 21:28

## 2017-01-01 RX ADMIN — POTASSIUM CHLORIDE 40 MEQ: 1.5 POWDER, FOR SOLUTION ORAL at 06:49

## 2017-01-01 RX ADMIN — LORAZEPAM 1 MG: 2 INJECTION, SOLUTION INTRAMUSCULAR; INTRAVENOUS at 03:46

## 2017-01-01 RX ADMIN — LORAZEPAM 1 MG: 1 TABLET ORAL at 12:46

## 2017-01-01 RX ADMIN — ACETAMINOPHEN 650 MG: 325 TABLET, FILM COATED ORAL at 09:02

## 2017-01-01 ASSESSMENT — ACTIVITIES OF DAILY LIVING (ADL)
DRESS: 0-->INDEPENDENT
ADLS_ACUITY_SCORE: 16
RETIRED_COMMUNICATION: 0-->UNDERSTANDS/COMMUNICATES WITHOUT DIFFICULTY
WHICH_OF_THE_ABOVE_FUNCTIONAL_RISKS_HAD_A_RECENT_ONSET_OR_CHANGE?: FALL HISTORY
ADLS_ACUITY_SCORE: 16
COGNITION: 0 - NO COGNITION ISSUES REPORTED
ADLS_ACUITY_SCORE: 16
TOILETING: 0-->INDEPENDENT
AMBULATION: 0-->INDEPENDENT
TRANSFERRING: 0-->INDEPENDENT
BATHING: 0-->INDEPENDENT
ADLS_ACUITY_SCORE: 10
RETIRED_EATING: 0-->INDEPENDENT
SWALLOWING: 0-->SWALLOWS FOODS/LIQUIDS WITHOUT DIFFICULTY
FALL_HISTORY_WITHIN_LAST_SIX_MONTHS: YES
NUMBER_OF_TIMES_PATIENT_HAS_FALLEN_WITHIN_LAST_SIX_MONTHS: 3
ADLS_ACUITY_SCORE: 16

## 2017-01-01 ASSESSMENT — ENCOUNTER SYMPTOMS
TREMORS: 1
HEADACHES: 1
DIARRHEA: 0
FREQUENCY: 0
VOMITING: 1
NAUSEA: 1
HALLUCINATIONS: 1
HEMATURIA: 0
HEADACHES: 0
SEIZURES: 1
CONSTIPATION: 0
DYSURIA: 0

## 2017-01-01 ASSESSMENT — PAIN DESCRIPTION - DESCRIPTORS: DESCRIPTORS: ACHING

## 2017-03-30 ENCOUNTER — APPOINTMENT (OUTPATIENT)
Dept: GENERAL RADIOLOGY | Facility: CLINIC | Age: 47
End: 2017-03-30
Attending: EMERGENCY MEDICINE
Payer: COMMERCIAL

## 2017-03-30 ENCOUNTER — HOSPITAL ENCOUNTER (EMERGENCY)
Facility: CLINIC | Age: 47
Discharge: HOME | End: 2017-03-30
Attending: EMERGENCY MEDICINE | Admitting: EMERGENCY MEDICINE
Payer: COMMERCIAL

## 2017-03-30 ENCOUNTER — APPOINTMENT (OUTPATIENT)
Dept: CT IMAGING | Facility: CLINIC | Age: 47
End: 2017-03-30
Attending: EMERGENCY MEDICINE
Payer: COMMERCIAL

## 2017-03-30 VITALS
RESPIRATION RATE: 20 BRPM | OXYGEN SATURATION: 96 % | HEART RATE: 87 BPM | WEIGHT: 210 LBS | DIASTOLIC BLOOD PRESSURE: 91 MMHG | HEIGHT: 68 IN | SYSTOLIC BLOOD PRESSURE: 136 MMHG | TEMPERATURE: 98.7 F | BODY MASS INDEX: 31.83 KG/M2

## 2017-03-30 DIAGNOSIS — R56.9 FIRST TIME SEIZURE (H): ICD-10-CM

## 2017-03-30 DIAGNOSIS — R73.9 ELEVATED RANDOM BLOOD GLUCOSE LEVEL: ICD-10-CM

## 2017-03-30 DIAGNOSIS — R74.8 ELEVATED LIVER ENZYMES: ICD-10-CM

## 2017-03-30 LAB
ALBUMIN SERPL-MCNC: 3.1 G/DL (ref 3.4–5)
ALBUMIN SERPL-MCNC: 3.1 G/DL (ref 3.4–5)
ALP SERPL-CCNC: 53 U/L (ref 40–150)
ALP SERPL-CCNC: 55 U/L (ref 40–150)
ALT SERPL W P-5'-P-CCNC: 110 U/L (ref 0–70)
ALT SERPL W P-5'-P-CCNC: 115 U/L (ref 0–70)
ANION GAP SERPL CALCULATED.3IONS-SCNC: 14 MMOL/L (ref 3–14)
ANION GAP SERPL CALCULATED.3IONS-SCNC: 15 MMOL/L (ref 3–14)
AST SERPL W P-5'-P-CCNC: 156 U/L (ref 0–45)
AST SERPL W P-5'-P-CCNC: 161 U/L (ref 0–45)
BASOPHILS # BLD AUTO: 0 10E9/L (ref 0–0.2)
BASOPHILS NFR BLD AUTO: 0.6 %
BILIRUB DIRECT SERPL-MCNC: 0.6 MG/DL (ref 0–0.2)
BILIRUB SERPL-MCNC: 2.5 MG/DL (ref 0.2–1.3)
BILIRUB SERPL-MCNC: 2.7 MG/DL (ref 0.2–1.3)
BUN SERPL-MCNC: 7 MG/DL (ref 7–30)
BUN SERPL-MCNC: 7 MG/DL (ref 7–30)
CALCIUM SERPL-MCNC: 7.8 MG/DL (ref 8.5–10.1)
CALCIUM SERPL-MCNC: 7.8 MG/DL (ref 8.5–10.1)
CHLORIDE SERPL-SCNC: 95 MMOL/L (ref 94–109)
CHLORIDE SERPL-SCNC: 96 MMOL/L (ref 94–109)
CO2 SERPL-SCNC: 24 MMOL/L (ref 20–32)
CO2 SERPL-SCNC: 27 MMOL/L (ref 20–32)
CREAT SERPL-MCNC: 0.85 MG/DL (ref 0.66–1.25)
CREAT SERPL-MCNC: 0.88 MG/DL (ref 0.66–1.25)
DIFFERENTIAL METHOD BLD: ABNORMAL
EOSINOPHIL # BLD AUTO: 0.1 10E9/L (ref 0–0.7)
EOSINOPHIL NFR BLD AUTO: 2.2 %
ERYTHROCYTE [DISTWIDTH] IN BLOOD BY AUTOMATED COUNT: 13.3 % (ref 10–15)
ETHANOL SERPL-MCNC: <0.01 G/DL
GFR SERPL CREATININE-BSD FRML MDRD: ABNORMAL ML/MIN/1.7M2
GFR SERPL CREATININE-BSD FRML MDRD: ABNORMAL ML/MIN/1.7M2
GLUCOSE SERPL-MCNC: 279 MG/DL (ref 70–99)
GLUCOSE SERPL-MCNC: 280 MG/DL (ref 70–99)
HBA1C MFR BLD: 7.5 % (ref 4.3–6)
HCT VFR BLD AUTO: 44.4 % (ref 40–53)
HGB BLD-MCNC: 15.9 G/DL (ref 13.3–17.7)
IMM GRANULOCYTES # BLD: 0 10E9/L (ref 0–0.4)
IMM GRANULOCYTES NFR BLD: 0.5 %
INTERPRETATION ECG - MUSE: NORMAL
LIPASE SERPL-CCNC: 320 U/L (ref 73–393)
LIPASE SERPL-CCNC: 330 U/L (ref 73–393)
LYMPHOCYTES # BLD AUTO: 1.5 10E9/L (ref 0.8–5.3)
LYMPHOCYTES NFR BLD AUTO: 23.9 %
MCH RBC QN AUTO: 37.2 PG (ref 26.5–33)
MCHC RBC AUTO-ENTMCNC: 35.8 G/DL (ref 31.5–36.5)
MCV RBC AUTO: 104 FL (ref 78–100)
MONOCYTES # BLD AUTO: 1 10E9/L (ref 0–1.3)
MONOCYTES NFR BLD AUTO: 15.2 %
NEUTROPHILS # BLD AUTO: 3.7 10E9/L (ref 1.6–8.3)
NEUTROPHILS NFR BLD AUTO: 57.6 %
NRBC # BLD AUTO: 0 10*3/UL
NRBC BLD AUTO-RTO: 0 /100
PLATELET # BLD AUTO: 102 10E9/L (ref 150–450)
POTASSIUM SERPL-SCNC: 3 MMOL/L (ref 3.4–5.3)
POTASSIUM SERPL-SCNC: 3.1 MMOL/L (ref 3.4–5.3)
PROT SERPL-MCNC: 6 G/DL (ref 6.8–8.8)
PROT SERPL-MCNC: 6.2 G/DL (ref 6.8–8.8)
RBC # BLD AUTO: 4.27 10E12/L (ref 4.4–5.9)
SODIUM SERPL-SCNC: 134 MMOL/L (ref 133–144)
SODIUM SERPL-SCNC: 137 MMOL/L (ref 133–144)
TROPONIN I SERPL-MCNC: NORMAL UG/L (ref 0–0.04)
TROPONIN I SERPL-MCNC: NORMAL UG/L (ref 0–0.04)
WBC # BLD AUTO: 6.4 10E9/L (ref 4–11)

## 2017-03-30 PROCEDURE — 83690 ASSAY OF LIPASE: CPT | Mod: 91 | Performed by: EMERGENCY MEDICINE

## 2017-03-30 PROCEDURE — 80320 DRUG SCREEN QUANTALCOHOLS: CPT | Performed by: EMERGENCY MEDICINE

## 2017-03-30 PROCEDURE — 71020 XR CHEST 2 VW: CPT

## 2017-03-30 PROCEDURE — 85025 COMPLETE CBC W/AUTO DIFF WBC: CPT | Performed by: EMERGENCY MEDICINE

## 2017-03-30 PROCEDURE — 99285 EMERGENCY DEPT VISIT HI MDM: CPT | Mod: 25

## 2017-03-30 PROCEDURE — 82248 BILIRUBIN DIRECT: CPT | Performed by: EMERGENCY MEDICINE

## 2017-03-30 PROCEDURE — 96374 THER/PROPH/DIAG INJ IV PUSH: CPT

## 2017-03-30 PROCEDURE — 70450 CT HEAD/BRAIN W/O DYE: CPT

## 2017-03-30 PROCEDURE — 80053 COMPREHEN METABOLIC PANEL: CPT | Performed by: EMERGENCY MEDICINE

## 2017-03-30 PROCEDURE — 96361 HYDRATE IV INFUSION ADD-ON: CPT

## 2017-03-30 PROCEDURE — 83036 HEMOGLOBIN GLYCOSYLATED A1C: CPT | Performed by: EMERGENCY MEDICINE

## 2017-03-30 PROCEDURE — 84484 ASSAY OF TROPONIN QUANT: CPT | Performed by: EMERGENCY MEDICINE

## 2017-03-30 PROCEDURE — 25000128 H RX IP 250 OP 636: Performed by: EMERGENCY MEDICINE

## 2017-03-30 PROCEDURE — 93005 ELECTROCARDIOGRAM TRACING: CPT

## 2017-03-30 PROCEDURE — 36415 COLL VENOUS BLD VENIPUNCTURE: CPT | Performed by: EMERGENCY MEDICINE

## 2017-03-30 RX ORDER — SODIUM CHLORIDE 9 MG/ML
1000 INJECTION, SOLUTION INTRAVENOUS CONTINUOUS
Status: DISCONTINUED | OUTPATIENT
Start: 2017-03-30 | End: 2017-03-30 | Stop reason: HOSPADM

## 2017-03-30 RX ORDER — LORAZEPAM 2 MG/ML
1 INJECTION INTRAMUSCULAR ONCE
Status: COMPLETED | OUTPATIENT
Start: 2017-03-30 | End: 2017-03-30

## 2017-03-30 RX ADMIN — LORAZEPAM 1 MG: 2 INJECTION INTRAMUSCULAR; INTRAVENOUS at 13:29

## 2017-03-30 RX ADMIN — SODIUM CHLORIDE 1000 ML: 9 INJECTION, SOLUTION INTRAVENOUS at 13:29

## 2017-03-30 ASSESSMENT — ENCOUNTER SYMPTOMS
HEADACHES: 1
SEIZURES: 1

## 2017-03-30 NOTE — ED PROVIDER NOTES
Signed out by Dr Palencia at change of shift.  In brief patient presents after first time seizure.  Imaging negative.  Labs pending.  Neurology consulted and will see patient in clinic tomorrow if blood work is appropriate for discharge.  Recommended against starting anticonvulsant at this time.    Bilirubin direct: 0.6 (H)  Alcohol ethyl: <0.01  Troponin (Collected 1430): <0.015  Lipase: 330  CMP: potassium 3.0 (L), glucose 280 (H), calcium 7.8 (L), bilirubin total 2.7 (H), albumin 3.1 (L), protein total 6.0 (L),  (H),  (H) o/w WNL (Creatinine: 0.85)  CBC:  (L) o/w WNL. (WBC 6.4, HGB 15.9)     UA: pending***        ***

## 2017-03-30 NOTE — ED NOTES
Pt finished boxed lunch, remains tremulous. Pt able to ambulate w/out assistance. MD notified. Pt states he feels safe going home and has assistance from family members.

## 2017-03-30 NOTE — ED AVS SNAPSHOT
[facility] Emergency Department    [address]    [facility] [address]    Phone:  [phone]    Fax:  [phone]                                       Julio Nina   MRN: [id number]    Department:  [facility] Emergency Department   Date of Visit:  3/30/2017           Patient Information     Date Of Birth          1970        Your diagnoses for this visit were:     First time seizure (H)     Elevated liver enzymes     Elevated random blood glucose level        You were seen by Spike Palencia MD and Leyla Renee MD.      Follow-up Information     Follow up with Ezequiel Crabtree MD. Schedule an appointment as soon as possible for a visit in 1 day.    Contact information:    [facility]  [address]   [facility] [address]  [phone]          Discharge Instructions         Seizure: New Onset, Unknown Cause (Adult)  You have had a seizure today. A seizure happens when a burst of electrical activity occurs in the brain. A seizure can have many causes. Often it s not possible to figure out the exact cause of a seizure from 1 exam. You might need other tests. Having 1 seizure doesn t mean that you will continue to have seizures. But until doctors know the cause of your seizure, you should assume that another seizure is possible.  Home care  Follow these tips when caring for yourself at home. For this seizure:    Seizures aren t predictable. So avoid doing anything that might cause danger to you or other people if you have another seizure. Don t drive, ride a bike, or operate dangerous equipment.    Don t take a bath alone. Take a shower instead.    Don t swim alone until your health care provider says that you are no longer in danger of having another seizure.    Tell your close friends and relatives about your seizure. Teach them what to do for you if it happens again.    If medicine was prescribed to prevent seizures,  "take it exactly as directed. It does not work when taken \"as needed.\" Missing doses will increase the risk of having another seizure.  For future seizures, if you are alone:  If you feel a seizure coming on, lie down on a bed or on the floor with something soft under your head. Lie on your side, not on your back. This will keep you from falling. It will also let fluid drain out of your mouth and prevent choking. Be sure you are clear of any objects that might injure you during the seizure. Call 911 if there is time.  For future seizures, if someone is with you:  The person should help you get into a safe position and call 911. The person shouldn t try to force anything in your mouth once the seizure begins. This could harm your teeth or jaw.  After a seizure, you may be drowsy or confused. The person should stay with you until you are fully awake. The person shouldn t offer you anything to eat or drink during that time. Call 911 or go to the emergency department so that you can be looked at.  Follow-up care  Follow up with your health care provider. You may need other tests to help figure out what caused your seizure. These tests may include brain wave tests or brain scans. Keep a seizure calendar to record how often you have a seizure. If you are being started on anti-seizure medicine, make sure that you use additional pregnancy protection. Seizure medicine can affect how well birth control pills work, and you could become pregnant. Avoid alcohol until your doctor tells you it s OK.  Note: For the safety of yourself and others on the road, certain states require that the treating doctor tell the Public Health Department about any adult who is treated for a seizure and is at risk of more seizures. In this case, the Department of Motor Vehicles will be told. A restriction will be put on your  s license until a doctor gives you medical clearance to drive again. Contact your treating doctor to find out if your " state requires the reporting of patients with a seizures condition.  When to seek medical advice  Call your health care provider right away if any of these occur:    Another seizure    Fever over 100.4 F (38.0 C)    Unusual irritability, drowsiness, or confusion    Headache or neck pain that gets worse       0251-3721 [employer]. [address] [place] [address]. All rights reserved. This information is not intended as a substitute for professional medical care. Always follow your healthcare professional's instructions.          24 Hour Appointment Hotline       To make an appointment at any [facility], call [phone] ([phone]). If you don't have a family doctor or clinic, we will help you find one. [facility] are conveniently located to serve the needs of you and your family.             Review of your medicines      Our records show that you are taking the medicines listed below. If these are incorrect, please call your family doctor or clinic.        Dose / Directions Last dose taken    GLEEVEC PO        Take by mouth daily   Refills:  0                Procedures and tests performed during your visit     Procedure/Test Number of Times Performed    Alcohol ethyl 1    Bilirubin direct 1    CBC with platelets differential 1    CT Head w/o Contrast 1    Comprehensive metabolic panel 2    EKG 12-lead, tracing only 1    Hemoglobin A1c 1    Lipase 2    Troponin I 2    XR Chest 2 Views 1      Orders Needing Specimen Collection     Ordered          03/30/17 1308  UA with Microscopic - STAT, [facility]: STAT, Needs to be Collected     Scheduled Task Status   03/30/17 1308 Collect UA with Microscopic Open   Order Class:  PCU Collect                  Pending Results     Date and Time Order Name Status Description    3/30/2017 1315 Hemoglobin A1c In process     3/30/2017 1308 Troponin I In process     3/30/2017 1308 Lipase In process     3/30/2017 1308 Comprehensive metabolic panel In  process             Pending Culture Results     No orders found from 3/28/2017 to 3/31/2017.             Test Results from your hospital stay     3/30/2017  2:09 PM - Interface, Radiant Ib      Narrative     XR CHEST 2 VW  3/30/2017 2:04 PM    HISTORY:  seizure    COMPARISON:  None.        Impression     IMPRESSION:  Negative.     CAROLEE AKERS MD         3/30/2017  3:30 PM - Interface, Radiant Ib      Narrative     CT SCAN OF THE HEAD WITHOUT CONTRAST March 30, 2017 1:59 PM     HISTORY: Seizure.    TECHNIQUE: Axial images of the head and coronal reformations without  IV contrast material. Radiation dose for this scan was reduced using  automated exposure control, adjustment of the mA and/or kV according  to patient size, or iterative reconstruction technique.    COMPARISON: None.    FINDINGS: There is some very minimal cerebral atrophy. Brain  parenchyma is otherwise normal. There is no evidence for intracranial  hemorrhage, mass effect, acute infarct, or skull fracture.        Impression     IMPRESSION: Mild cerebral atrophy.    HUYEN OSBORNE MD         3/30/2017  1:34 PM - Interface, Flexilab Results      Component Results     Component Value Ref Range & Units Status    WBC 6.4 4.0 - 11.0 10e9/L Final    RBC Count 4.27 (L) 4.4 - 5.9 10e12/L Final    Hemoglobin 15.9 13.3 - 17.7 g/dL Final    Hematocrit 44.4 40.0 - 53.0 % Final     (H) 78 - 100 fl Final    MCH 37.2 (H) 26.5 - 33.0 pg Final    MCHC 35.8 31.5 - 36.5 g/dL Final    RDW 13.3 10.0 - 15.0 % Final    Platelet Count 102 (L) 150 - 450 10e9/L Final    Diff Method Automated Method  Final    % Neutrophils 57.6 % Final    % Lymphocytes 23.9 % Final    % Monocytes 15.2 % Final    % Eosinophils 2.2 % Final    % Basophils 0.6 % Final    % Immature Granulocytes 0.5 % Final    Nucleated RBCs 0 0 /100 Final    Absolute Neutrophil 3.7 1.6 - 8.3 10e9/L Final    Absolute Lymphocytes 1.5 0.8 - 5.3 10e9/L Final    Absolute Monocytes 1.0 0.0 - 1.3 10e9/L Final     Absolute Eosinophils 0.1 0.0 - 0.7 10e9/L Final    Absolute Basophils 0.0 0.0 - 0.2 10e9/L Final    Abs Immature Granulocytes 0.0 0 - 0.4 10e9/L Final    Absolute Nucleated RBC 0.0  Final         3/30/2017  1:29 PM - Interface, Flexilab Results         3/30/2017  1:29 PM - Interface, Flexilab Results         3/30/2017  1:29 PM - Interface, Flexilab Results         3/30/2017  3:15 PM - Interface, Flexilab Results      Component Results     Component Value Ref Range & Units Status    Sodium 137 133 - 144 mmol/L Final    Potassium 3.0 (L) 3.4 - 5.3 mmol/L Final    Chloride 96 94 - 109 mmol/L Final    Carbon Dioxide 27 20 - 32 mmol/L Final    Anion Gap 14 3 - 14 mmol/L Final    Glucose 280 (H) 70 - 99 mg/dL Final    Urea Nitrogen 7 7 - 30 mg/dL Final    Creatinine 0.85 0.66 - 1.25 mg/dL Final    GFR Estimate >90  Non  GFR Calc   >60 mL/min/1.7m2 Final    GFR Estimate If Black >90   GFR Calc   >60 mL/min/1.7m2 Final    Calcium 7.8 (L) 8.5 - 10.1 mg/dL Final    Bilirubin Total 2.7 (H) 0.2 - 1.3 mg/dL Final    Albumin 3.1 (L) 3.4 - 5.0 g/dL Final    Protein Total 6.0 (L) 6.8 - 8.8 g/dL Final    Alkaline Phosphatase 55 40 - 150 U/L Final     (H) 0 - 70 U/L Final     (H) 0 - 45 U/L Final         3/30/2017  3:15 PM - Interface, Flexilab Results      Component Results     Component Value Ref Range & Units Status    Lipase 330 73 - 393 U/L Final         3/30/2017  3:17 PM - Interface, Flexilab Results      Component Results     Component Value Ref Range & Units Status    Troponin I ES  0.000 - 0.045 ug/L Final    <0.015  The 99th percentile for upper reference range is 0.045 ug/L.  Troponin values in   the range of 0.045 - 0.120 ug/L may be associated with risks of adverse   clinical events.           3/30/2017  3:15 PM - Interface, Flexilab Results      Component Results     Component Value Ref Range & Units Status    Ethanol g/dL <0.01 <0.01 g/dL Final         3/30/2017  3:40 PM -  Interface, Flexilab Results      Component Results     Component Value Ref Range & Units Status    Bilirubin Direct 0.6 (H) 0.0 - 0.2 mg/dL Final         3/30/2017  3:31 PM - Interface, Flexilab Results                Clinical Quality Measure: Blood Pressure Screening     Your blood pressure was checked while you were in the emergency department today. The last reading we obtained was  BP: (!) 134/96 . Please read the guidelines below about what these numbers mean and what you should do about them.  If your systolic blood pressure (the top number) is less than 120 and your diastolic blood pressure (the bottom number) is less than 80, then your blood pressure is normal. There is nothing more that you need to do about it.  If your systolic blood pressure (the top number) is 120-139 or your diastolic blood pressure (the bottom number) is 80-89, your blood pressure may be higher than it should be. You should have your blood pressure rechecked within a year by a primary care provider.  If your systolic blood pressure (the top number) is 140 or greater or your diastolic blood pressure (the bottom number) is 90 or greater, you may have high blood pressure. High blood pressure is treatable, but if left untreated over time it can put you at risk for heart attack, stroke, or kidney failure. You should have your blood pressure rechecked by a primary care provider within the next 4 weeks.  If your provider in the emergency department today gave you specific instructions to follow-up with your doctor or provider even sooner than that, you should follow that instruction and not wait for up to 4 weeks for your follow-up visit.        Thank you for choosing [facility]       Thank you for choosing [facility] for your care. Our goal is always to provide you with excellent care. Hearing back from our patients is one way we can continue to improve our services. Please take a few minutes to complete the written survey that you may receive  "in the mail after you visit with us. Thank you!        [employer] Information     [employer] lets you send messages to your doctor, view your test results, renew your prescriptions, schedule appointments and more. To sign up, go to [email or web address] . Click on \"Log in\" on the left side of the screen, which will take you to the Welcome page. Then click on \"Sign up Now\" on the right side of the page.     You will be asked to enter the access code listed below, as well as some personal information. Please follow the directions to create your username and password.     Your access code is: [email or web address]  Expires: 2017  4:06 PM     Your access code will  in 90 days. If you need help or a new code, please call your [facility] clinic or [phone].        Care EveryWhere ID     This is your Care EveryWhere ID. This could be used by other organizations to access your [facility] medical records  [id number]        After Visit Summary       This is your record. Keep this with you and show to your community pharmacist(s) and doctor(s) at your next visit.                  "

## 2017-03-30 NOTE — LETTER
[facility] EMERGENCY DEPARTMENT  [address] [facility] [address]  [phone]      2017    Julio Nina  [address] [place] [address], [address]  [facility] [address]  [phone] (home) [phone] (work)    : 1970      To Whom it may concern:    Julio Nina was seen in our Emergency Department today, 2017.  Based on his evaluation he should not drive until further work-up is complete.  He can return to work if alternate transportation can be arranged.    Sincerely,      Leyla Renee MD

## 2017-03-30 NOTE — ED NOTES
Witnessed full body seizure at work lasting about 1 minute. Incontinent of urine.  .  No history of Seizures. Headache behind left eye over the past couple days. Airway, breathing and circulation intact.

## 2017-03-30 NOTE — ED NOTES
Bed: ED03  Expected date: 3/30/17  Expected time: 12:56 PM  Means of arrival: Ambulance  Comments:  BV2  45yo M SEIZURE

## 2017-03-30 NOTE — ED PROVIDER NOTES
"  History     Chief Complaint:  Seizure    HPI   Julio Nina is a 46 year old male, with a history of CML, currently treated with Gleevec, who presents via EMS following a seizure-like episode at work. The patient has had a headache for the past two days, along with a cough. Today at work, his co-workers witnessed him fall backwards, and begin convulsing. The convulsions lasted for about one minute. EMS was called, and by the time they arrived he had started to come to. He has never had a seizure before. He did drink two beers last night.    Allergies:  The patient has no known drug allergies.    Medications:    Gleevac    Past Medical History:    CML    Past Surgical History:    History reviewed.  No significant past surgical history.    Family History:    History reviewed.  No significant family history.    Social History:  Alcohol use: Positive  The patient presents via EMS.     Review of Systems   Neurological: Positive for seizures and headaches.   All other systems reviewed and are negative.      Physical Exam   First Vitals:   BP: 129/107  Temp: 98.7  F (37.1  C)  Temp src: Oral  Pulse: 87  Resp: 20  SpO2: 95 %  Height: 172.7 cm (5' 8\")  Weight: 95.3 kg (210 lb) (03/30 1308-03/30 1415)    Physical Exam  Constitutional: Patient is well appearing. No distress.  Head: Atraumatic.  Mouth/Throat: Oropharynx is clear and moist. No oropharyngeal exudate.  Eyes: Conjunctivae and EOM are normal. No scleral icterus.  Neck: Normal range of motion. Neck supple.   Cardiovascular: Normal rate, regular rhythm, normal heart sounds and intact distal pulses.   Pulmonary/Chest: Breath sounds normal. No respiratory distress.  Abdominal: Soft. Bowel sounds are normal. No distension. No tenderness. No rebound or guarding.   Musculoskeletal: Normal range of motion. No edema or tenderness.   Neuro: Alert, oriented x3, PERRL, EOMI, CN 2-7 and 9-12 intact, 5/5 grasp BUE, 5/5 elbow flexion and extension BUE, 5/5 shoulder " abduction BUE, 5/5 hip flexion, knee flexion, knee extension, plantar and dorsiflexion BLE, no pronator drift, normal gait, negative romberg, no dysdiadochokinesia, normal finger-nose-finger testing    Skin: Warm and dry. No rash noted. Not diaphoretic.       Emergency Department Course   ECG (13:09:31):  Indication: Seizure.   Rate 86 bpm. KS interval 134. QRS duration 92. QT/QTc 418/500. P-R-T axes 20.   Interpretation: Normal sinus rhythm. Nonspecific ST abnormality. Prolonged QT.  Agree with computer interpretation.   Interpreted at 1312 by Dr. Palencia.    Imaging:  Radiographic findings were communicated with the patient who voiced understanding of the findings.    Chest XR, per radiology:  Negative.    Head CT, without contrast, per radiology:   Mild cerebral atrophy.    Laboratory:  CBC: WBC 6.4, HGB 15.9,  (L)  CMP:Pending  Lipase: Pending  Troponin I: Pending  Blood alcohol: Pending    Interventions:  1329: Normal Saline, 1000 mL, IV  1329: Ativan, 1.0 mg, IV injection    Emergency Department Course:  Nursing notes and vitals reviewed.  I performed an exam of the patient as documented above.  The above workup was undertaken.  1434: I spoke with Dr. Crabtree of neurology, who advised against starting any seizure medications. He will have his nurse call him tomorrow.    I rechecked the patient and discussed results.    Findings and plan explained to the Patient. Patient discharged home, status improved, with instructions regarding supportive care, medications, and reasons to return as well as the importance of close follow-up was reviewed.       Impression & Plan      Medical Decision Making:  First time genralized seizure without focal abnormality and extensive screening workup neg.  No hx of heavy etoh use and denies.  Also nothing point toward syncope or heart at this time.  Case discussed with shan Crabtree that agrees and will see by follow up tomorrow.  Labs pending at care transition my  colleagues that will dc when results returned.      Diagnosis:    ICD-10-CM   1. Other seizures (H) G40.89     Disposition:  Discharge to home with neurology follow up.     I, Rakan Camp, am serving as a scribe on 3/30/2017 at 1:08 PM to personally document services performed by Spike Palencia MD, based on my observations and the provider's statements to me.    [facility] EMERGENCY DEPARTMENT       Spike Palencia MD  03/30/17 8308

## 2017-03-30 NOTE — DISCHARGE INSTRUCTIONS
"  Seizure: New Onset, Unknown Cause (Adult)  You have had a seizure today. A seizure happens when a burst of electrical activity occurs in the brain. A seizure can have many causes. Often it s not possible to figure out the exact cause of a seizure from 1 exam. You might need other tests. Having 1 seizure doesn t mean that you will continue to have seizures. But until doctors know the cause of your seizure, you should assume that another seizure is possible.  Home care  Follow these tips when caring for yourself at home. For this seizure:    Seizures aren t predictable. So avoid doing anything that might cause danger to you or other people if you have another seizure. Don t drive, ride a bike, or operate dangerous equipment.    Don t take a bath alone. Take a shower instead.    Don t swim alone until your health care provider says that you are no longer in danger of having another seizure.    Tell your close friends and relatives about your seizure. Teach them what to do for you if it happens again.    If medicine was prescribed to prevent seizures, take it exactly as directed. It does not work when taken \"as needed.\" Missing doses will increase the risk of having another seizure.  For future seizures, if you are alone:  If you feel a seizure coming on, lie down on a bed or on the floor with something soft under your head. Lie on your side, not on your back. This will keep you from falling. It will also let fluid drain out of your mouth and prevent choking. Be sure you are clear of any objects that might injure you during the seizure. Call 911 if there is time.  For future seizures, if someone is with you:  The person should help you get into a safe position and call 911. The person shouldn t try to force anything in your mouth once the seizure begins. This could harm your teeth or jaw.  After a seizure, you may be drowsy or confused. The person should stay with you until you are fully awake. The person shouldn t " offer you anything to eat or drink during that time. Call 911 or go to the emergency department so that you can be looked at.  Follow-up care  Follow up with your health care provider. You may need other tests to help figure out what caused your seizure. These tests may include brain wave tests or brain scans. Keep a seizure calendar to record how often you have a seizure. If you are being started on anti-seizure medicine, make sure that you use additional pregnancy protection. Seizure medicine can affect how well birth control pills work, and you could become pregnant. Avoid alcohol until your doctor tells you it s OK.  Note: For the safety of yourself and others on the road, certain states require that the treating doctor tell the Public Health Department about any adult who is treated for a seizure and is at risk of more seizures. In this case, the Department of Motor Vehicles will be told. A restriction will be put on your  s license until a doctor gives you medical clearance to drive again. Contact your treating doctor to find out if your state requires the reporting of patients with a seizures condition.  When to seek medical advice  Call your health care provider right away if any of these occur:    Another seizure    Fever over 100.4 F (38.0 C)    Unusual irritability, drowsiness, or confusion    Headache or neck pain that gets worse       2817-7166 [employer]. [address] [place] [address], [place] [address]. All rights reserved. This information is not intended as a substitute for professional medical care. Always follow your healthcare professional's instructions.

## 2017-03-30 NOTE — ED AVS SNAPSHOT
[facility] Emergency Department    [address]    [facility] [address]    Phone:  [phone]    Fax:  [phone]                                       Julio Nina   MRN: [id number]    Department:  [facility] Emergency Department   Date of Visit:  3/30/2017           After Visit Summary Signature Page     I have received my discharge instructions, and my questions have been answered. I have discussed any challenges I see with this plan with the nurse or doctor.    ..........................................................................................................................................  Patient/Patient Representative Signature      ..........................................................................................................................................  Patient Representative Print Name and Relationship to Patient    ..................................................               ................................................  Date                                            Time    ..........................................................................................................................................  Reviewed by Signature/Title    ...................................................              ..............................................  Date                                                            Time

## 2017-03-30 NOTE — ED NOTES
Pt appears tremulous while going through discharge paperwork. Pt reports that he is hungry. Pt given boxed lunch and fluids.

## 2017-08-26 NOTE — DISCHARGE INSTRUCTIONS
Do not drive until cleared by neurology  Recheck potassium in 3 days        Discharge Instructions for Epilepsy  You have been diagnosed with epilepsy, a disorder of recurring seizures. When you have a seizure, an electrical disturbance happens in your brain. There are different kinds of seizures, and each patient may have one or many types of seizures. Here are some guidelines for you and your family.  If you have a seizure  Ask friends and family members to learn seizure management. Also, tell them to do the following if you have a seizure:    Clear the area to prevent injury.    Position you on a flat, carpeted surface, if possible.    Don t try to restrain you.    Don t put anything in your mouth.    Turn you onto your side if you start to vomit.    Keep track of the date and time the seizure started, how long it lasted, whether or not you lost consciousness, a description of your body movements, what provoked the seizure (if known), and any injuries you suffered. Using a watch may help keep correct time of events.      Stay with you until you regain consciousness.    Call 911 if the seizure is longer than 5 minutes, if there are multiple seizures, or if you do not begin to wake up after the seizure stops.    Activities  Following are some things to consider:    Enjoy your normal activities. Most people with epilepsy lead normal lives.    Avoid hazardous activities, such as mountain climbing or scuba diving. A seizure under these conditions could lead to a fatal accident.    Do not swim alone or participate in other similar activities without others nearby.    Ask your healthcare provider about any restrictions on driving or other activities.    Check with your state department of public safety to learn whether there are any driving limitations based on your condition.  Other home care  Other considerations:    Take your medicine exactly as directed. Skipping doses can affect the way your body handles the  medicine, which could cause you to have a seizure.    Don t drink alcohol or use any medicine without talking with your healthcare provider first.    Seizure medicines may interact with other medicines. Make sure all of your healthcare providers have a list of all your medicines.     Birth control pills may not work as effectively when taking seizure medicines. Ask your healthcare provider if a change in birth control is needed.     Wear a medical alert pendant or bracelet that alerts others to your condition, especially if you are allergic to seizure medicine.    Join a local support group. Ask your healthcare provider for names and phone numbers.  .  When to seek medical attention  Tell your family members or friends to call 911 right away if you have:    Seizure that lasts more than 5 minutes    Multiple seizures in a row    No recovery of consciousness after the seizure stops  Otherwise, have them call your healthcare provider right away if you have:    Seizures that are getting longer and worse    Seizures that are different from those you ve had in the past    Seizures strong enough to cause injury    Skin rash    Fever of 100.4 F (38 C) or higher, or as directed by your healthcare provider   Date Last Reviewed: 11/5/2015 2000-2017 [employer]. [address] [place] [address]. All rights reserved. This information is not intended as a substitute for professional medical care. Always follow your healthcare professional's instructions.        Potassium-Rich Foods  The normal adult diet usually contains 2,000 mg to 4,000 mg of potassium per day. More potassium is needed when you lose too much potassium from your body. This can happen if you have diarrhea or vomiting. It can also happen if you take a medicine to make you urinate more (diuretic). To increase the amount of potassium in your diet, include these high-potassium foods.     [The (*) indicates foods highest in  potassium.]  Vegetables  Artichokes. Cooked 1/2 cup, 200 mg to 300 mg*  Asparagus. Cooked 1/2 cup, 200 mg to 300 mg  Beans. White, red, jensen cooked 1/2 cup, 300 mg to 500 mg*  Beets. Cooked 1/2 cup, 200 mg to 300 mg  Broccoli. Cooked or raw 1 cup, 200 mg to 500 mg*  [place] sprouts. Cooked 1/2 cup, 200 mg to 300 mg  Cabbage. Raw 1 cup, 100 mg to 200 mg  Carrots. Raw or cooked 1/2 cup, 100 mg to 200 mg  Celery. Raw 1 cup, 200 mg to 300 mg  Lima beans. Fresh or frozen 1/2 cup, 300 mg to 500 mg*   Mushrooms. Raw or cooked 1/2 cup, 100 mg to 300 mg  Peas. Cooked 1/2 cup, 150 mg to 250 mg   Potatoes. Baked 1 medium, 500 mg to 900 mg*   Spinach. Cooked 1 cup, 800 mg to 900 mg*   Spinach. Raw 2 cups, 300 mg to 400 mg *  Squash, winter. Fresh, frozen, or cooked 1/2 cup, 200 mg to 400 mg   Tomato. Fresh 1 medium, 200 mg to 300 mg   Tomato juice. Canned 1/2 cup, 200 mg to 300 mg   Fruits  Apple juice. Unsweetened 1 cup, 200 mg to 300 mg   Apricots. Canned 1/2 cup, 200 mg to 300 mg   Apricots. Dried 4 pieces, 100 mg to 200 mg   Avocado. Raw 1/2 cup, 300 mg to 400 mg*  Banana. Fresh 1 small, 300 mg to 400 mg*   Cantaloupe. Fresh 1 cup diced, 300 mg to 400 mg*   Grape juice. Unsweetened 1 cup, 200 mg to 300 mg   Honeydew melon. Fresh 1 cup diced, 300 mg to 400 mg*   Orange. Fresh 1 medium, 200 mg to 300 mg    Orange juice. Unsweetened, fresh or frozen 1/2 cup, 200 mg to 300 mg  Pineapple juice. Unsweetened 1 cup, 300 mg to 400 mg   Prune juice. Unsweetened 1/2 cup, 300 mg to 400 mg*   Prunes. Dried 5 pieces, 300 mg to 400 mg*   Strawberries. Fresh or frozen 1 cup, 200 mg to 300 mg  Meat  Red meat. Cooked 3 ounces, 100 mg to 300 mg   Seafood  Cod, flounder, halibut. Cooked 3 ounces, 100 mg to 300 mg*  [place]. Cooked, 3 ounces 300 mg to 400 mg*   Scallops. Cooked 3 ounces, 200 mg to 300 mg*  Shrimp. Cooked 3/4 cup, 100 mg to 200 mg   Tuna. Fresh or canned 3/4 cup, 200 mg to 500 mg   Date Last Reviewed: 10/1/2016    8365-1775 The  [employer]. [address] [place] [address], [place] [address]. All rights reserved. This information is not intended as a substitute for professional medical care. Always follow your healthcare professional's instructions.

## 2017-08-26 NOTE — ED NOTES
Patient presents via EMS for a complaint of a seizure. EMS reports the patient was at [facility] and bystanders report a minute long fully body seizure. EMS reports that the patient fell backwards striking the back of his head. Hematoma noted to the posterior scalp. Patient also has a history of seizures and leukemia. ABC intact, Alert, Disoriented to place.

## 2017-08-26 NOTE — ED NOTES
Bed: ED35  Expected date: 8/26/17  Expected time: 11:52 AM  Means of arrival: Ambulance  Comments:  BV-1 - 48 yo male

## 2017-08-26 NOTE — ED AVS SNAPSHOT
[facility] Emergency Department    [address]    [facility] [address]    Phone:  [phone]    Fax:  [phone]                                       Julio Nina   MRN: [id number]    Department:  [facility] Emergency Department   Date of Visit:  8/26/2017           After Visit Summary Signature Page     I have received my discharge instructions, and my questions have been answered. I have discussed any challenges I see with this plan with the nurse or doctor.    ..........................................................................................................................................  Patient/Patient Representative Signature      ..........................................................................................................................................  Patient Representative Print Name and Relationship to Patient    ..................................................               ................................................  Date                                            Time    ..........................................................................................................................................  Reviewed by Signature/Title    ...................................................              ..............................................  Date                                                            Time

## 2017-08-26 NOTE — ED AVS SNAPSHOT
[facility]    [address]    [facility] [address]    Phone:  [phone]    Fax:  [phone]                                       Julio Nina   MRN: [id number]    Department:  [facility]   Date of Visit:  8/26/2017           Patient Information     Date Of Birth          1970        Your diagnoses for this visit were:     Recurrent seizures (H)     Hypokalemia        You were seen by Lavelle Estrada MD.      Follow-up Information     Follow up with Ebony Vyas MD. Go in 3 days.    Specialty:  Neurology    Contact information:    [facility] [place] [facility]  [address] [facility] [address] [phone]          Follow up with [facility]. Go in 3 days.    Specialties:  Sports Medicine, Pain Management, Obstetrics & Gynecology, Pediatrics, Internal Medicine, Nephrology    Why:  recheck potassium    Contact information:    [address]  [facility] [address] [phone]        Discharge Instructions       Do not drive until cleared by neurology  Recheck potassium in 3 days        Discharge Instructions for Epilepsy  You have been diagnosed with epilepsy, a disorder of recurring seizures. When you have a seizure, an electrical disturbance happens in your brain. There are different kinds of seizures, and each patient may have one or many types of seizures. Here are some guidelines for you and your family.  If you have a seizure  Ask friends and family members to learn seizure management. Also, tell them to do the following if you have a seizure:    Clear the area to prevent injury.    Position you on a flat, carpeted surface, if possible.    Don t try to restrain you.    Don t put anything in your mouth.    Turn you onto your side if you start to vomit.    Keep track of the date and time the seizure started, how long it lasted, whether or not  you lost consciousness, a description of your body movements, what provoked the seizure (if known), and any injuries you suffered. Using a watch may help keep correct time of events.      Stay with you until you regain consciousness.    Call 911 if the seizure is longer than 5 minutes, if there are multiple seizures, or if you do not begin to wake up after the seizure stops.    Activities  Following are some things to consider:    Enjoy your normal activities. Most people with epilepsy lead normal lives.    Avoid hazardous activities, such as mountain climbing or scuba diving. A seizure under these conditions could lead to a fatal accident.    Do not swim alone or participate in other similar activities without others nearby.    Ask your healthcare provider about any restrictions on driving or other activities.    Check with your state department of public safety to learn whether there are any driving limitations based on your condition.  Other home care  Other considerations:    Take your medicine exactly as directed. Skipping doses can affect the way your body handles the medicine, which could cause you to have a seizure.    Don t drink alcohol or use any medicine without talking with your healthcare provider first.    Seizure medicines may interact with other medicines. Make sure all of your healthcare providers have a list of all your medicines.     Birth control pills may not work as effectively when taking seizure medicines. Ask your healthcare provider if a change in birth control is needed.     Wear a medical alert pendant or bracelet that alerts others to your condition, especially if you are allergic to seizure medicine.    Join a local support group. Ask your healthcare provider for names and phone numbers.  .  When to seek medical attention  Tell your family members or friends to call 911 right away if you have:    Seizure that lasts more than 5 minutes    Multiple seizures in a row    No recovery of  consciousness after the seizure stops  Otherwise, have them call your healthcare provider right away if you have:    Seizures that are getting longer and worse    Seizures that are different from those you ve had in the past    Seizures strong enough to cause injury    Skin rash    Fever of 100.4 F (38 C) or higher, or as directed by your healthcare provider   Date Last Reviewed: 11/5/2015 2000-2017 [employer]. [address] [place] [address], [place] [address]. All rights reserved. This information is not intended as a substitute for professional medical care. Always follow your healthcare professional's instructions.        Potassium-Rich Foods  The normal adult diet usually contains 2,000 mg to 4,000 mg of potassium per day. More potassium is needed when you lose too much potassium from your body. This can happen if you have diarrhea or vomiting. It can also happen if you take a medicine to make you urinate more (diuretic). To increase the amount of potassium in your diet, include these high-potassium foods.     [The (*) indicates foods highest in potassium.]  Vegetables  Artichokes. Cooked 1/2 cup, 200 mg to 300 mg*  Asparagus. Cooked 1/2 cup, 200 mg to 300 mg  Beans. White, red, jensen cooked 1/2 cup, 300 mg to 500 mg*  Beets. Cooked 1/2 cup, 200 mg to 300 mg  Broccoli. Cooked or raw 1 cup, 200 mg to 500 mg*  [place] sprouts. Cooked 1/2 cup, 200 mg to 300 mg  Cabbage. Raw 1 cup, 100 mg to 200 mg  Carrots. Raw or cooked 1/2 cup, 100 mg to 200 mg  Celery. Raw 1 cup, 200 mg to 300 mg  Lima beans. Fresh or frozen 1/2 cup, 300 mg to 500 mg*   Mushrooms. Raw or cooked 1/2 cup, 100 mg to 300 mg  Peas. Cooked 1/2 cup, 150 mg to 250 mg   Potatoes. Baked 1 medium, 500 mg to 900 mg*   Spinach. Cooked 1 cup, 800 mg to 900 mg*   Spinach. Raw 2 cups, 300 mg to 400 mg *  Squash, winter. Fresh, frozen, or cooked 1/2 cup, 200 mg to 400 mg   Tomato. Fresh 1 medium, 200 mg to 300 mg   Tomato juice. Canned 1/2 cup, 200  mg to 300 mg   Fruits  Apple juice. Unsweetened 1 cup, 200 mg to 300 mg   Apricots. Canned 1/2 cup, 200 mg to 300 mg   Apricots. Dried 4 pieces, 100 mg to 200 mg   Avocado. Raw 1/2 cup, 300 mg to 400 mg*  Banana. Fresh 1 small, 300 mg to 400 mg*   Cantaloupe. Fresh 1 cup diced, 300 mg to 400 mg*   Grape juice. Unsweetened 1 cup, 200 mg to 300 mg   Honeydew melon. Fresh 1 cup diced, 300 mg to 400 mg*   Orange. Fresh 1 medium, 200 mg to 300 mg    Orange juice. Unsweetened, fresh or frozen 1/2 cup, 200 mg to 300 mg  Pineapple juice. Unsweetened 1 cup, 300 mg to 400 mg   Prune juice. Unsweetened 1/2 cup, 300 mg to 400 mg*   Prunes. Dried 5 pieces, 300 mg to 400 mg*   Strawberries. Fresh or frozen 1 cup, 200 mg to 300 mg  Meat  Red meat. Cooked 3 ounces, 100 mg to 300 mg   Seafood  Cod, flounder, halibut. Cooked 3 ounces, 100 mg to 300 mg*  [place]. Cooked, 3 ounces 300 mg to 400 mg*   Scallops. Cooked 3 ounces, 200 mg to 300 mg*  Shrimp. Cooked 3/4 cup, 100 mg to 200 mg   Tuna. Fresh or canned 3/4 cup, 200 mg to 500 mg   Date Last Reviewed: 10/1/2016    2709-6628 [employer]. [address] [place] [address]. All rights reserved. This information is not intended as a substitute for professional medical care. Always follow your healthcare professional's instructions.          24 Hour Appointment Hotline       To make an appointment at any [facility] clinic, call [phone] ([phone]). If you don't have a family doctor or clinic, we will help you find one. [facility] clinics are conveniently located to serve the needs of you and your family.             Review of your medicines      START taking        Dose / Directions Last dose taken    levETIRAcetam 500 MG tablet   Commonly known as:  KEPPRA   Dose:  500 mg   Quantity:  60 tablet        Take 1 tablet (500 mg) by mouth 2 times daily   Refills:  0        potassium chloride SA 20 MEQ CR tablet   Commonly known as:  potassium chloride   Dose:   20 mEq   Quantity:  3 tablet        Take 1 tablet (20 mEq) by mouth daily for 3 days   Refills:  0          Our records show that you are taking the medicines listed below. If these are incorrect, please call your family doctor or clinic.        Dose / Directions Last dose taken    GLEEVEC PO        Take by mouth daily   Refills:  0                Prescriptions were sent or printed at these locations (2 Prescriptions)                   Other Prescriptions                Printed at Department/Unit printer (2 of 2)         potassium chloride SA (POTASSIUM CHLORIDE) 20 MEQ CR tablet               levETIRAcetam (KEPPRA) 500 MG tablet                Procedures and tests performed during your visit     Procedure/Test Number of Times Performed    Basic metabolic panel 1    CBC with platelets differential 1    CT Head w/o Contrast 1    Peripheral IV: Standard 2    UA with Microscopic 1      Orders Needing Specimen Collection     None      Pending Results     No orders found from 8/24/2017 to 8/27/2017.            Pending Culture Results     No orders found from 8/24/2017 to 8/27/2017.            Pending Results Instructions     If you had any lab results that were not finalized at the time of your Discharge, you can call the ED Lab Result RN at [phone]. You will be contacted by this team for any positive Lab results or changes in treatment. The nurses are available 7 days a week from 10A to 6:30P.  You can leave a message 24 hours per day and they will return your call.        Test Results From Your Hospital Stay        8/26/2017 12:40 PM      Component Results     Component Value Ref Range & Units Status    WBC 13.0 (H) 4.0 - 11.0 10e9/L Final    RBC Count 4.24 (L) 4.4 - 5.9 10e12/L Final    Hemoglobin 15.0 13.3 - 17.7 g/dL Final    Hematocrit 42.2 40.0 - 53.0 % Final     78 - 100 fl Final    MCH 35.4 (H) 26.5 - 33.0 pg Final    MCHC 35.5 31.5 - 36.5 g/dL Final    RDW 13.6 10.0 - 15.0 % Final    Platelet Count  167 150 - 450 10e9/L Final    Diff Method Automated Method  Final    % Neutrophils 77.2 % Final    % Lymphocytes 10.6 % Final    % Monocytes 9.8 % Final    % Eosinophils 0.3 % Final    % Basophils 1.0 % Final    % Immature Granulocytes 1.1 % Final    Nucleated RBCs 0 0 /100 Final    Absolute Neutrophil 10.0 (H) 1.6 - 8.3 10e9/L Final    Absolute Lymphocytes 1.4 0.8 - 5.3 10e9/L Final    Absolute Monocytes 1.3 0.0 - 1.3 10e9/L Final    Absolute Eosinophils 0.0 0.0 - 0.7 10e9/L Final    Absolute Basophils 0.1 0.0 - 0.2 10e9/L Final    Abs Immature Granulocytes 0.1 0 - 0.4 10e9/L Final    Absolute Nucleated RBC 0.0  Final         8/26/2017 12:57 PM      Component Results     Component Value Ref Range & Units Status    Sodium 135 133 - 144 mmol/L Final    Potassium 2.7 (L) 3.4 - 5.3 mmol/L Final    Chloride 96 94 - 109 mmol/L Final    Carbon Dioxide 24 20 - 32 mmol/L Final    Anion Gap 15 (H) 3 - 14 mmol/L Final    Glucose 198 (H) 70 - 99 mg/dL Final    Urea Nitrogen 8 7 - 30 mg/dL Final    Creatinine 0.82 0.66 - 1.25 mg/dL Final    GFR Estimate >90 >60 mL/min/1.7m2 Final    Non  GFR Calc    GFR Estimate If Black >90 >60 mL/min/1.7m2 Final    African American GFR Calc    Calcium 8.7 8.5 - 10.1 mg/dL Final         8/26/2017  2:23 PM      Narrative     CT OF THE HEAD WITHOUT CONTRAST 8/26/2017 12:57 PM     COMPARISON: Head CT 3/30/2017.    HISTORY: Seizure.    TECHNIQUE: 5 mm thick axial CT images of the head were acquired  without IV contrast material.    FINDINGS:  There is mild diffuse cerebral volume loss. There are  subtle patchy areas of decreased density in the cerebral white matter  bilaterally that are consistent with sequela of chronic small vessel  ischemic disease.     The ventricles and basal cisterns are within normal limits in  configuration given the degree of cerebral volume loss.  There is no  midline shift. There are no extra-axial fluid collections.     No intracranial hemorrhage, mass  or recent infarct.    The visualized paranasal sinuses are well-aerated. There is no  mastoiditis. There are no fractures of the visualized bones.         Impression     IMPRESSION:  Diffuse cerebral volume loss and cerebral white matter  changes consistent with chronic small vessel ischemic disease. No  evidence for acute intracranial pathology.       Radiation dose for this scan was reduced using automated exposure  control, adjustment of the mA and/or kV according to patient size, or  iterative reconstruction technique.    BRYSON PEDERSEN MD         8/26/2017  1:58 PM      Component Results     Component Value Ref Range & Units Status    Color Urine Tammy  Final    Appearance Urine Cloudy  Final    Glucose Urine 150 (A) NEG^Negative mg/dL Final    Bilirubin Urine Small (A) NEG^Negative Final    This is an unconfirmed screening test result. A positive result may be false.    Ketones Urine 20 (A) NEG^Negative mg/dL Final    Specific Gravity Urine 1.026 1.003 - 1.035 Final    Blood Urine Small (A) NEG^Negative Final    pH Urine 5.0 5.0 - 7.0 pH Final    Protein Albumin Urine 100 (A) NEG^Negative mg/dL Final    Urobilinogen mg/dL 2.0 0.0 - 2.0 mg/dL Final    Nitrite Urine Negative NEG^Negative Final    Leukocyte Esterase Urine Negative NEG^Negative Final    Source Midstream Urine  Final    WBC Urine 12 (H) 0 - 2 /HPF Final    RBC Urine 20 (H) 0 - 2 /HPF Final    Squamous Epithelial /HPF Urine 1 0 - 1 /HPF Final    Mucous Urine Present (A) NEG^Negative /LPF Final    sperm Present (A) NEG^Negative /HPF Final    Hyaline Casts 25 (H) 0 - 2 /LPF Final    Amorphous Crystals Many (A) NEG^Negative /HPF Final                Clinical Quality Measure: Blood Pressure Screening     Your blood pressure was checked while you were in the emergency department today. The last reading we obtained was  BP: (!) 139/94 . Please read the guidelines below about what these numbers mean and what you should do about them.  If your systolic  "blood pressure (the top number) is less than 120 and your diastolic blood pressure (the bottom number) is less than 80, then your blood pressure is normal. There is nothing more that you need to do about it.  If your systolic blood pressure (the top number) is 120-139 or your diastolic blood pressure (the bottom number) is 80-89, your blood pressure may be higher than it should be. You should have your blood pressure rechecked within a year by a primary care provider.  If your systolic blood pressure (the top number) is 140 or greater or your diastolic blood pressure (the bottom number) is 90 or greater, you may have high blood pressure. High blood pressure is treatable, but if left untreated over time it can put you at risk for heart attack, stroke, or kidney failure. You should have your blood pressure rechecked by a primary care provider within the next 4 weeks.  If your provider in the emergency department today gave you specific instructions to follow-up with your doctor or provider even sooner than that, you should follow that instruction and not wait for up to 4 weeks for your follow-up visit.        Thank you for choosing [facility]       Thank you for choosing [facility] for your care. Our goal is always to provide you with excellent care. Hearing back from our patients is one way we can continue to improve our services. Please take a few minutes to complete the written survey that you may receive in the mail after you visit with us. Thank you!        [facility] Information     [facility] lets you send messages to your doctor, view your test results, renew your prescriptions, schedule appointments and more. To sign up, go to [email or web address] . Click on \"Log in\" on the left side of the screen, which will take you to the Welcome page. Then click on \"Sign up Now\" on the right side of the page.     You will be asked to enter the access code listed below, as well as some personal information. Please follow the " directions to create your username and password.     Your access code is: [email or web address]  Expires: 2017  3:33 PM     Your access code will  in 90 days. If you need help or a new code, please call your [facility] clinic or [phone].        Care EveryWhere ID     This is your Care EveryWhere ID. This could be used by other organizations to access your [facility] medical records  [id number]        Equal Access to Services     JUD LAFLEUR : Hadii vivian kerro Sonargis, waaxda luqadaha, qaybta kaalmada adeegyada, jorge ham . So [facility] [phone].    ATENCIÓN: Si habla español, tiene a rocha disposición servicios gratuitos de asistencia lingüística. Llame al [phone].    We comply with applicable federal civil rights laws and [place] laws. We do not discriminate on the basis of race, color, national origin, age, disability sex, sexual orientation or gender identity.            After Visit Summary       This is your record. Keep this with you and show to your community pharmacist(s) and doctor(s) at your next visit.

## 2017-10-11 PROBLEM — R56.9 SEIZURES (H): Status: ACTIVE | Noted: 2017-01-01

## 2017-10-11 PROBLEM — Z87.39 HISTORY OF GOUT: Status: ACTIVE | Noted: 2017-01-01

## 2017-10-11 PROBLEM — C92.10 CHRONIC MYELOID LEUKEMIA (H): Status: ACTIVE | Noted: 2017-01-01

## 2017-10-11 NOTE — PROGRESS NOTES
SUBJECTIVE:   CC: Julio Nina is an 47 year old male who presents for preventative health visit.     Physical   Annual:     Getting at least 3 servings of Calcium per day::  Yes    Bi-annual eye exam::  NO    Dental care twice a year::  NO    Sleep apnea or symptoms of sleep apnea::  Daytime drowsiness    Frequency of exercise::  None    Taking medications regularly::  No    Barriers to taking medications::  Other    Medication side effects::  Significant flushing, Lightheadedness and Other    Additional concerns today::  No      Developed CML 15 yr ago. Sees [facility].    Patient Active Problem List   Diagnosis     Chronic myeloid leukemia (H)     Seizures (H)     History of gout     Current Outpatient Prescriptions   Medication Sig Dispense Refill     imatinib (GLEEVEC) 400 MG tablet Take 1 tablet (400 mg) by mouth daily       metroNIDAZOLE 0.75 % LOTN Apply 1 Film topically 2 times daily 59 mL 1     levETIRAcetam (KEPPRA) 500 MG tablet Take 1 tablet (500 mg) by mouth 2 times daily 60 tablet 0     [DISCONTINUED] Imatinib Mesylate (GLEEVEC PO) Take by mouth daily           Some concerns:  Edema  Foot pain, knee pain.  Arthralgias. Has a Rheum appt.  Facial rash      Today's PHQ-2 Score: PHQ-2 ( 1999 Pfizer) 10/11/2017   Q1: Little interest or pleasure in doing things 0   Q2: Feeling down, depressed or hopeless 0   PHQ-2 Score 0   Q1: Little interest or pleasure in doing things Not at all   Q2: Feeling down, depressed or hopeless Not at all   PHQ-2 Score 0       Abuse: Current or Past(Physical, Sexual or Emotional)- No  Do you feel safe in your environment - Yes    Social History   Substance Use Topics     Smoking status: Never Smoker     Smokeless tobacco: Never Used     Alcohol use Yes     The patient does not drink >3 drinks per day nor >7 drinks per week.    Single. One 18 y/o daughter.    Trade computer work.        Last PSA: No results found for: PSA    Reviewed orders with patient. Reviewed health  "maintenance and updated orders accordingly - Yes      Reviewed and updated as needed this visit by clinical staff         Reviewed and updated as needed this visit by Provider              ROS:  C: NEGATIVE for fever, chills, change in weight  INTEGUMENTARY/SKIN: facial rash  E: NEGATIVE for vision changes or irritation  ENT: NEGATIVE for ear, mouth and throat problems  R: NEGATIVE for significant cough or SOB  CV: NEGATIVE for chest pain, palpitations or peripheral edema  GI: NEGATIVE for nausea, abdominal pain, heartburn, or change in bowel habits   male: negative for dysuria, hematuria, decreased urinary stream, erectile dysfunction, urethral discharge  MUSCULOSKELETAL:arthralgias  N: NEGATIVE for weakness, dizziness or paresthesias  HEME/ALLERGY/IMMUNE: CML  P: NEGATIVE for changes in mood or affect    OBJECTIVE:   /76 (BP Location: Left arm, Patient Position: Sitting, Cuff Size: Adult Large)  Pulse 78  Temp 98.2  F (36.8  C) (Oral)  Ht 5' 8\" (1.727 m)  Wt 200 lb (90.7 kg)  SpO2 99%  BMI 30.41 kg/m2    EXAM:  GENERAL: healthy, alert and no distress, some overweight  EYES: Eyes grossly normal to inspection, PERRL and conjunctivae and sclerae normal  HENT: ear canals and TM's normal, nose and mouth without ulcers or lesions  NECK: no adenopathy, no asymmetry, masses, or scars and thyroid normal to palpation  RESP: lungs clear to auscultation - no rales, rhonchi or wheezes  CV: regular rate and rhythm, normal S1 S2, no S3 or S4, no murmur, click or rub, no peripheral edema and peripheral pulses strong  ABDOMEN: soft, nontender, no hepatosplenomegaly, no masses and bowel sounds normal   (male): normal male genitalia without lesions or urethral discharge, no hernia  MS: no knee swelling, L foot puffy, no redness  SKIN: red, sl papular rash nose, cheeks ? rosacea  NEURO: Normal strength and tone, mentation intact and speech normal  PSYCH: mentation appears normal, affect " "normal/bright    ASSESSMENT/PLAN:   1. Routine history and physical examination of adult    - Comprehensive metabolic panel (BMP + Alb, Alk Phos, ALT, AST, Total. Bili, TP)  - Lipid panel reflex to direct LDL  - PSA, screen  - UA with Microscopic reflex to Culture    2. Chronic myeloid leukemia (H)    - imatinib (GLEEVEC) 400 MG tablet; Take 1 tablet (400 mg) by mouth daily  - CBC with platelets and differential  - Comprehensive metabolic panel (BMP + Alb, Alk Phos, ALT, AST, Total. Bili, TP)    3. Seizures (H)  Recent - in last yr  - Comprehensive metabolic panel (BMP + Alb, Alk Phos, ALT, AST, Total. Bili, TP)    4. History of gout    - Uric acid    5. Leg edema  May treat although diuretic could aggravate gout    6. Rosacea    - metroNIDAZOLE 0.75 % LOTN; Apply 1 Film topically 2 times daily  Dispense: 59 mL; Refill: 1    COUNSELING:   Reviewed preventive health counseling, as reflected in patient instructions       Healthy diet/nutrition       Prostate cancer screening           reports that he has never smoked. He has never used smokeless tobacco.      Estimated body mass index is 31.93 kg/(m^2) as calculated from the following:    Height as of 3/30/17: 5' 8\" (1.727 m).    Weight as of 3/30/17: 210 lb (95.3 kg).   Weight management plan: just getting started    Counseling Resources:  ATP IV Guidelines  Pooled Cohorts Equation Calculator  FRAX Risk Assessment  ICSI Preventive Guidelines  Dietary Guidelines for Americans, 2010  USDA's MyPlate  ASA Prophylaxis  Lung CA Screening    Gonzalo Elise MD  [facility]  Answers for HPI/ROS submitted by the patient on 10/11/2017   PHQ-2 Score: 0    "

## 2017-10-11 NOTE — MR AVS SNAPSHOT
"              After Visit Summary   10/11/2017    Julio Nina    MRN: [id number]           Patient Information     Date Of Birth          1970        Visit Information        Provider Department      10/11/2017 4:20 PM Gonzalo Elise MD [facility]        Today's Diagnoses     Routine history and physical examination of adult    -  1    Chronic myeloid leukemia (H)        Seizures (H)        History of gout        Leg edema        Rosacea           Follow-ups after your visit        Who to contact     If you have questions or need follow up information about today's clinic visit or your schedule please contact [facility] directly at [phone].  Normal or non-critical lab and imaging results will be communicated to you by [employer], letter or phone within 4 business days after the clinic has received the results. If you do not hear from us within 7 days, please contact the clinic through [employer] or phone. If you have a critical or abnormal lab result, we will notify you by phone as soon as possible.  Submit refill requests through [employer] or call your pharmacy and they will forward the refill request to us. Please allow 3 business days for your refill to be completed.          Additional Information About Your Visit        MyChart Information     [employer] lets you send messages to your doctor, view your test results, renew your prescriptions, schedule appointments and more. To sign up, go to [email or web address] . Click on \"Log in\" on the left side of the screen, which will take you to the Welcome page. Then click on \"Sign up Now\" on the right side of the page.     You will be asked to enter the access code listed below, as well as some personal information. Please follow the directions to create your username and password.     Your access code is: [email or web address]  Expires: 2017  3:33 PM     Your access code will  in 90 days. If you need help or a new code, " "please call your [facility] clinic or [phone].        Care EveryWhere ID     This is your Care EveryWhere ID. This could be used by other organizations to access your [facility] medical records  [id number]        Your Vitals Were     Pulse Temperature Height Pulse Oximetry BMI (Body Mass Index)       78 98.2  F (36.8  C) (Oral) 5' 8\" (1.727 m) 99% 30.41 kg/m2        Blood Pressure from Last 3 Encounters:   10/11/17 124/76   08/26/17 (!) 139/94   03/30/17 (!) 136/91    Weight from Last 3 Encounters:   10/11/17 200 lb (90.7 kg)   03/30/17 210 lb (95.3 kg)              We Performed the Following     CBC with platelets and differential     Comprehensive metabolic panel (BMP + Alb, Alk Phos, ALT, AST, Total. Bili, TP)     Lipid panel reflex to direct LDL     PSA, screen     UA with Microscopic reflex to Culture     Uric acid          Today's Medication Changes          These changes are accurate as of: 10/11/17  5:06 PM.  If you have any questions, ask your nurse or doctor.               Start taking these medicines.        Dose/Directions    metroNIDAZOLE 0.75 % Lotn   Used for:  Rosacea   Started by:  Gonzalo Elise MD        Dose:  1 Film   Apply 1 Film topically 2 times daily   Quantity:  59 mL   Refills:  1         These medicines have changed or have updated prescriptions.        Dose/Directions    GLEEVEC 400 MG tablet   This may have changed:  Another medication with the same name was removed. Continue taking this medication, and follow the directions you see here.   Used for:  Chronic myeloid leukemia (H)   Generic drug:  imatinib   Changed by:  Gonazlo Elise MD        Dose:  400 mg   Take 1 tablet (400 mg) by mouth daily   Refills:  0            Where to get your medicines      These medications were sent to [id number] [place] [id number]     Phone:  [phone]     metroNIDAZOLE 0.75 % Lotn                Primary Care " Provider    Physician No Ref-Primary       NO REF-PRIMARY PHYSICIAN        Equal Access to Services     MILY LAFLEUR : Hadii aad ku hadjasendaniel Jiménez, alex gray, estherjorge jarrell. So [facility] [phone].    ATENCIÓN: Si habla español, tiene a rocha disposición servicios gratuitos de asistencia lingüística. Llame al [phone].    We comply with applicable federal civil rights laws and [place] laws. We do not discriminate on the basis of race, color, national origin, age, disability, sex, sexual orientation, or gender identity.            Thank you!     Thank you for choosing [facility]  for your care. Our goal is always to provide you with excellent care. Hearing back from our patients is one way we can continue to improve our services. Please take a few minutes to complete the written survey that you may receive in the mail after your visit with us. Thank you!             Your Updated Medication List - Protect others around you: Learn how to safely use, store and throw away your medicines at www.disposemymeds.org.          This list is accurate as of: 10/11/17  5:06 PM.  Always use your most recent med list.                   Brand Name Dispense Instructions for use Diagnosis    GLEEVEC 400 MG tablet   Generic drug:  imatinib      Take 1 tablet (400 mg) by mouth daily    Chronic myeloid leukemia (H)       levETIRAcetam 500 MG tablet    KEPPRA    60 tablet    Take 1 tablet (500 mg) by mouth 2 times daily        metroNIDAZOLE 0.75 % Lotn     59 mL    Apply 1 Film topically 2 times daily    Rosacea

## 2017-10-11 NOTE — NURSING NOTE
"Chief Complaint   Patient presents with     Physical       Initial /76 (BP Location: Left arm, Patient Position: Sitting, Cuff Size: Adult Large)  Pulse 78  Temp 98.2  F (36.8  C) (Oral)  Ht 5' 8\" (1.727 m)  Wt 200 lb (90.7 kg)  SpO2 99%  BMI 30.41 kg/m2 Estimated body mass index is 30.41 kg/(m^2) as calculated from the following:    Height as of this encounter: 5' 8\" (1.727 m).    Weight as of this encounter: 200 lb (90.7 kg).  Medication Reconciliation: complete    "

## 2017-10-18 NOTE — TELEPHONE ENCOUNTER
Patient calling for results of 10/11/17 labs stating he was told he would hear back last week.  He was specifically interested in glucose as it had been elevated in the past though he admits he was not fasting for lab draw 10/11/17.  He also wants to know if he needs to start back on allopurinol for elevation in Uric Acid.  He would like a call back with all recommendations.  JEVON Guadarrama R.N.

## 2017-10-25 PROBLEM — F10.931 ALCOHOL WITHDRAWAL DELIRIUM (H): Status: ACTIVE | Noted: 2017-01-01

## 2017-10-25 NOTE — ED NOTES
Patient provided with second cup of water. No vomiting while in ED. Patient tolerating water and denies nausea.

## 2017-10-25 NOTE — PLAN OF CARE
Problem: Patient Care Overview  Goal: Plan of Care/Patient Progress Review  Outcome: Improving    10/25/17 0274   OTHER   Plan Of Care Reviewed With patient   Plan of Care Review   Progress progress towards functional goals is fair   ICU End of Shift Summary.  For vital signs and complete assessments, please see documentation flowsheets.      Pertinent assessments: Anxious at times, CIWA this a.m. 10-14 now this afternoon 5-8. Slightly tremulous. B.P. Little high no interventions. Lungs clear, RA, VSS, impulsive this a.m. and pulled out IV. Confused to place/situation at times. Rash on chest, face reddened.  Major Shift Events: Neuro consult, nothing new to add, continue keppra.Mag replaced recheck normal. Lactate better.   Plan (Upcoming Events): chem dep, CIWA, monitor for seizures. Rx trying to locate chemo drug to be given tonight. [facility] does not have, pt does not have either.  Discharge/Transfer Needs: tele overflow, d/c to home when stable.     Bedside Shift Report Completed   Bedside Safety Check Completed

## 2017-10-25 NOTE — IP AVS SNAPSHOT
MRN:[id number]                      After Visit Summary   10/25/2017    Julio Nina    MRN: [id number]           Thank you!     Thank you for choosing [facility] for your care. Our goal is always to provide you with excellent care. Hearing back from our patients is one way we can continue to improve our services. Please take a few minutes to complete the written survey that you may receive in the mail after you visit. If you would like to speak to someone directly about your visit please contact Patient Relations at [phone]. Thank you!          Patient Information     Date Of Birth          1970        Designated Caregiver       Most Recent Value    Caregiver    Will someone help with your care after discharge? no      About your hospital stay     You were admitted on:  October 25, 2017 You last received care in the:  [place] [address] [facility]    You were discharged on:  October 27, 2017        Reason for your hospital stay       You were hospitalized for seizures and alcohol withdrawal.                  Who to Call     For medical emergencies, please call 911.  For non-urgent questions about your medical care, please call your primary care provider or clinic, [phone]          Attending Provider     Provider Specialty    Kassidy Ortiz MD Emergency Medicine    [place], Danae Gonzales MD Internal Medicine       Primary Care Provider Office Phone # Fax #    Gonzalo Elise [phone]      After Care Instructions     Activity       Your activity upon discharge: activity as tolerated            Diet       Follow this diet upon discharge: Orders Placed This Encounter      Combination Diet Regular Diet Adult                  Follow-up Appointments     Follow-up and recommended labs and tests        Follow up with primary care provider, Gonzalo Elise, within 7-10 days, for hospital follow- up. No follow up labs or test are needed.          "         Further instructions from your care team       1. Completely abstain from alcohol.  2. Take a multivitamin once daily.  3. Follow up with your primary care doctor in the next 7-10 days.    CM: To   Schedule a CD assessment call [phone]    Pending Results     No orders found from 10/23/2017 to 10/26/2017.            Statement of Approval     Ordered          10/27/17 1048  I have reviewed and agree with all the recommendations and orders detailed in this document.  EFFECTIVE NOW     Approved and electronically signed by:  Helen Tate MD             Admission Information     Date & Time Provider Department Dept. Phone    10/25/2017 Danae Finch MD [place] [address] [facility] [phone]      Your Vitals Were     Blood Pressure Pulse Temperature Respirations Height Weight    160/101 (BP Location: Left arm) 96 98.4  F (36.9  C) (Oral) 20 1.727 m (5' 8\") 82.6 kg (182 lb 1.6 oz)    Pulse Oximetry BMI (Body Mass Index)                97% 27.69 kg/m2          [employer] Information     [employer] lets you send messages to your doctor, view your test results, renew your prescriptions, schedule appointments and more. To sign up, go to [email or web address] . Click on \"Log in\" on the left side of the screen, which will take you to the Welcome page. Then click on \"Sign up Now\" on the right side of the page.     You will be asked to enter the access code listed below, as well as some personal information. Please follow the directions to create your username and password.     Your access code is: [email or web address]  Expires: 2017  3:33 PM     Your access code will  in 90 days. If you need help or a new code, please call your [place] clinic or [phone].        Care EveryWhere ID     This is your Care EveryWhere ID. This could be used by other organizations to access your [place] medical records  [id number]        Equal Access to Services     MILY LAFLEUR AH: Sandip farrell " Soomaali, waaxda luqadaha, qaybta kaalmada sally, jorge awadnuvia shruthi. So [facility] [phone].    ATENCIÓN: Si sabra moss, tiene a rocha disposición servicios gratuitos de asistencia lingüística. Consuelo al [phone].    We comply with applicable federal civil rights laws and [place] laws. We do not discriminate on the basis of race, color, national origin, age, disability, sex, sexual orientation, or gender identity.               Review of your medicines      CONTINUE these medicines which have NOT CHANGED        Dose / Directions    GLEEVEC 400 MG tablet   Used for:  Chronic myeloid leukemia (H)   Generic drug:  imatinib        Dose:  400 mg   Take 400 mg by mouth daily Per [facility] RN: Gleevec 400mg daily was restarted by Dr. Lang on Sept 1st, 2017 (held in August for possible side effects).   Refills:  0       levETIRAcetam 500 MG tablet   Commonly known as:  KEPPRA        Dose:  500 mg   Take 1 tablet (500 mg) by mouth 2 times daily   Quantity:  60 tablet   Refills:  0       metroNIDAZOLE 0.75 % Lotn   Used for:  Rosacea        Dose:  1 Film   Apply 1 Film topically 2 times daily   Quantity:  59 mL   Refills:  1                Protect others around you: Learn how to safely use, store and throw away your medicines at www.disposemymeds.org.             Medication List: This is a list of all your medications and when to take them. Check marks below indicate your daily home schedule. Keep this list as a reference.      Medications           Morning Afternoon Evening Bedtime As Needed    GLEEVEC 400 MG tablet   Take 400 mg by mouth daily Per [facility] RN: Gleevec 400mg daily was restarted by Dr. Lang on Sept 1st, 2017 (held in August for possible side effects).   Last time this was given:  400 mg on 10/26/2017  8:42 PM   Generic drug:  imatinib                                levETIRAcetam 500 MG tablet   Commonly known as:  KEPPRA   Take 1 tablet (500 mg) by mouth 2  times daily   Last time this was given:  500 mg on 10/27/2017  9:02 AM   Next Dose Due:  Tonight 10/27                                      metroNIDAZOLE 0.75 % Lotn   Apply 1 Film topically 2 times daily                                                More Information        Alcohol Withdrawal  Alcohol withdrawal usually begins after prolonged heavy drinking, and then you suddenly stop drinking, or cut down on your alcohol use. It is not one thing, but is a complex combination of signs and symptoms that generally occur to together and define a particular problem or condition.    Alcohol withdrawal is potentially life-threatening, and is a medical emergency.    It can start as early as a couple of hours after your last drink, or may take 1 to 3 days to develop.    It can last from days to a week or more.    It can worsen very quickly.  Signs and symptoms  There are several stages of alcohol withdrawal, although they overlap, as do their signs and symptoms. In the earlier stages, it most commonly includes:    Anxiety    Shakiness    Nausea and vomiting    Sweating    Insomnia    Headaches    Fever    Mood swings, irritability, agitation, restlessness  Delirium tremens (DTs)  DTs are a severe and life-threatening complication. If it happens, it usually begins about 3 to 5 days after your last drink. It is potentially life threatening. DTs are characterized by:    Sudden and severe mental or nervous system changes    Uncontrollable tremors    Severe disorientation, confusion, hallucinations    Heart racing, or irregular heartbeat    High blood pressure    Seizures    Possible coma and death  Home care    You will need plenty of rest and fluids over the next several days. Eat regular meals and drink plenty of fluids to prevent dehydration. Do not drink any more alcohol. During this time, it is best that you stay with family or friends who can help and support you. You can also admit yourself to a residential detox  program.    Do not drive until all symptoms are gone and you are feeling better. if you've had a seizure, don't drive until you have been examined by a doctor.    If you were given sedative medication to reduce your symptoms, do not take it more often than prescribed and never take it with alcohol.  Follow-up care  Once you have gone through the withdrawal symptoms, you have fought half of the franco. To avoid the risk of returning to your previous drinking pattern, it is essential that you get follow-up support and treatment.    Alcoholics Anonymous offers support through a self-help fellowship. There are no dues or fees. See the Yellow Pages and call for meeting times and places. www.aa.org    Al-Anon[employer]ia offers support to families of alcohol users. 118.828.7220 www.al-anon.org    National [place] On Alcoholism And Drug Dependence 780-348-0354 www.ncadd.org    Residential alcohol detox programs are available. Check the Yellow Pages under Drug Abuse & Treatment Centers.  Call 911  Call 911 if any of these occur:    Seizure    Trouble breathing or slow, irregular breathing    Chest pain    Sudden weakness on one side of the body or sudden trouble speaking    Heavy bleeding or vomiting blood    Very drowsy or trouble awakening    Fainting or loss of consciousness    Rapid heart rate  When to seek medical advice  Call your healthcare provider right away if any of these occur:    Severe shakiness    Hallucinations    Fever over 100.4  F (38.0  C)    Headache, confusion, extreme drowsiness, inability to awaken    Increasing upper abdominal pain    Repeated vomiting  Date Last Reviewed: 1/11/2016 2000-2017 [employer]. [address] [place] [address], [place] [address]. All rights reserved. This information is not intended as a substitute for professional medical care. Always follow your healthcare professional's instructions.                Alcoholism: Getting Help  Facing a problem with alcohol can be hard. Once a  person decides to get help, it can be found in many places. Below you will find resources that can give you more information. They can also help you find treatment.    Primary care  Speak with your primary healthcare provider. Sometimes your provider can provide medicine to help you stop drinking. If not, he or she can refer you to a specialist.  Professional care  This kind of care can be inpatient. It means you spend a period of time in a facility. Or it can be outpatient. This means you come and go. The facilities have medical support and can help a person detox. Most health insurance plans will cover at least some treatment. To find this kind of care, talk to your healthcare provider or a counselor. Or go to a mental health clinic and ask for information. You can also go online to Substance Abuse and Mental Health Services Administration.  Alcoholics Anonymous  Alcoholics Anonymous (AA) helps members get sober and stay sober. They help you build healthy patterns of living. Everyone is welcome at an AA meeting. You don't have to identify yourself. Some people find it easier to go to the first meeting with a friend. To find a meeting near you, contact Alcoholics Anonymous online at www.aa.org.  The road to recovery  Many people with alcoholism can give up alcohol for good. But change may not be easy or quick. Treatment is only a start. Relapses can be common. A relapse is not a sign of failure. Instead, it means treatment should continue. Once a person stops drinking, support is needed for them to stay sober. After care programs and groups, such as AA, are good for this kind of support.  Helpful websites    National [place] on Alcohol Abuse and Addiction    www.niaaa.nih.gov/alcohol-health    National [place] on Alcoholism and Drug Dependence, Inc. (NCADD)    www.ncadd.org    Alcoholism Anonymous    www.aa.org    Substance Abuse and Mental Health Services Administration (SAMHSA)    www.samhsa.gov/treatment    Date Last Reviewed: 2/1/2017 2000-2017 [employer]. [address] [place] [address], [place] [address]. All rights reserved. This information is not intended as a substitute for professional medical care. Always follow your healthcare professional's instructions.                Patient Education    Levetiracetam Oral solution    Levetiracetam Oral tablet    Levetiracetam Oral tablet, extended-release    Levetiracetam Solution for injection  Levetiracetam Oral tablet  What is this medicine?  LEVETIRACETAM (felice sujit matthewe CHELSEA hare) is an antiepileptic drug. It is used with other medicines to treat certain types of seizures.  This medicine may be used for other purposes; ask your health care provider or pharmacist if you have questions.  What should I tell my health care provider before I take this medicine?  They need to know if you have any of these conditions:    kidney disease    suicidal thoughts, plans, or attempt; a previous suicide attempt by you or a family member    an unusual or allergic reaction to levetiracetam, other medicines, foods, dyes, or preservatives    pregnant or trying to get pregnant    breast-feeding  How should I use this medicine?  Take this medicine by mouth with a glass of water. Follow the directions on the prescription label. Swallow the tablets whole. Do not crush or chew this medicine. You may take this medicine with or without food. Take your doses at regular intervals. Do not take your medicine more often than directed. Do not stop taking this medicine or any of your seizure medicines unless instructed by your doctor or health care professional. Stopping your medicine suddenly can increase your seizures or their severity.  A special MedGuide will be given to you by the pharmacist with each prescription and refill. Be sure to read this information carefully each time.  Contact your pediatrician or health care professional regarding the use of this medication in children. While  this drug may be prescribed for children as young as 4 years of age for selected conditions, precautions do apply.  Overdosage: If you think you have taken too much of this medicine contact a poison control center or emergency room at once.  NOTE: This medicine is only for you. Do not share this medicine with others.  What if I miss a dose?  If you miss a dose, take it as soon as you can. If it is almost time for your next dose, take only that dose. Do not take double or extra doses.  What may interact with this medicine?  This medicine may interact with the following medications:    carbamazepine    colesevelam    probenecid    sevelamer  This list may not describe all possible interactions. Give your health care provider a list of all the medicines, herbs, non-prescription drugs, or dietary supplements you use. Also tell them if you smoke, drink alcohol, or use illegal drugs. Some items may interact with your medicine.  What should I watch for while using this medicine?  Visit your doctor or health care professional for a regular check on your progress. Wear a medical identification bracelet or chain to say you have epilepsy, and carry a card that lists all your medications.  It is important to take this medicine exactly as instructed by your health care professional. When first starting treatment, your dose may need to be adjusted. It may take weeks or months before your dose is stable. You should contact your doctor or health care professional if your seizures get worse or if you have any new types of seizures.  You may get drowsy or dizzy. Do not drive, use machinery, or do anything that needs mental alertness until you know how this medicine affects you. Do not stand or sit up quickly, especially if you are an older patient. This reduces the risk of dizzy or fainting spells. Alcohol may interfere with the effect of this medicine. Avoid alcoholic drinks.  The use of this medicine may increase the chance of  suicidal thoughts or actions. Pay special attention to how you are responding while on this medicine. Any worsening of mood, or thoughts of suicide or dying should be reported to your health care professional right away.  Women who become pregnant while using this medicine may enroll in the [place] American Antiepileptic Drug Pregnancy Registry by calling 1-954.865.4884. This registry collects information about the safety of antiepileptic drug use during pregnancy.  What side effects may I notice from receiving this medicine?  Side effects you should report to your doctor or health care professional as soon as possible:    allergic reactions like skin rash, itching or hives, swelling of the face, lips, or tongue    breathing problems    dark urine    general ill feeling or flu-like symptoms    problems with balance, talking, walking    unusually weak or tired    worsening of mood, thoughts or actions of suicide or dying    yellowing of the eyes or skin  Side effects that usually do not require medical attention (report to your doctor or health care professional if they continue or are bothersome):    diarrhea    dizzy, drowsy    headache    loss of appetite  This list may not describe all possible side effects. Call your doctor for medical advice about side effects. You may report side effects to FDA at 1-460-FDA-1718.  Where should I keep my medicine?  Keep out of reach of children.  Store at room temperature between 15 and 30 degrees C (59 and 86 degrees F). Throw away any unused medicine after the expiration date.  NOTE:This sheet is a summary. It may not cover all possible information. If you have questions about this medicine, talk to your doctor, pharmacist, or health care provider. Copyright  2016 [employer]

## 2017-10-25 NOTE — ED NOTES
"Pt brought in by EMS. Pt c/o seizures at home. Per patient \"I had 3\". Patient states able to recall incident and felt like was falling when standing up from sitting. Pt Hx of Seizures. Patient currently with tremors and c/o nausea that started today. Per patient last drink 3 days ago. Patient states has never had withdrawal. Patient states usually has several beers every night for past many years now. Patient also stating seeing bugs \"making airplanes\" in his appointment and hearing \"music\" that no one else could hear. EMS gave Zofran 4mg IV. Glucose 206. ABCs intact.  "

## 2017-10-25 NOTE — PHARMACY-ADMISSION MEDICATION HISTORY
Admission medication history interview status for this patient is complete. See [facility] admission navigator for allergy information, prior to admission medications and immunization status.     Medication history interview source(s):Patient  Medication history resources (including written lists, pill bottles, clinic: Keppra Rx bottle.  Primary pharmacy: [facility] in [place] on [address]. Also [facility] mail order.    Changes made to PTA medication list:  Added: none  Deleted: none  Changed: none    Actions taken by pharmacist (provider contacted, etc): Called [facility] to clarify Gleevec - not on file. Called [facility] - unsuccessful. Called [facility], Dr. Lang @[phone].     Additional medication history information: Per [facility] RN: Gleevec 400mg daily was restarted by Dr. Lang on Sept 1st, 2017 (held in August for possible side effects). Per pt: takes generic Imatinib, did not take for a few days due to nausea.    Medication reconciliation/reorder completed by provider prior to medication history? Yes    Do you take OTC medications (eg tylenol, ibuprofen, fish oil, eye/ear drops, etc)? No(Y/N)    For patients on insulin therapy: No    Prior to Admission medications    Medication Sig Last Dose Taking? Auth Provider   imatinib (GLEEVEC) 400 MG tablet  (Takes generic per pt) Take 400 mg by mouth daily Per [facility] RN: Gleevec 400mg daily was restarted by Dr. Lang on Sept 1st, 2017 (held in August for possible side effects). Past Week at Unknown time Yes Gonzalo Elise MD   metroNIDAZOLE 0.75 % LOTN Apply 1 Film topically 2 times daily unknown Yes Gonzalo Elise MD   levETIRAcetam (KEPPRA) 500 MG tablet Take 1 tablet (500 mg) by mouth 2 times daily 10/24/2017 at Unknown time Yes Lavelle Estrada MD

## 2017-10-25 NOTE — CONSULTS
[facility]  Hematology/Oncology Consult Note             History:   47 year old male with CML, diagnosed in  and maintained on imatinib since then. 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 He developed nausea, emesis, and arthralgias earlier this year, and his imatinib was held for a few weeks, but he resumed imatinib at 400 mg daily in early September. He states that he has been taking his drug consistently over the past month. He also has a history of a seizure disorder, diagnosed earlier this year.      He was admitted today after episodes of shaking, felt to be related either to seizure activity or alcohol withdrawal symptoms. He has been hydrated with iv fluids, restarted on levetiracetam, and given lorazepam, and he is feeling much better at present.                 Physical Exam:   Vital signs:  Temp 98.5                      BP Systolic (24hrs), Av , Min:132 , Max:176   Alert and comfortable.  Sclerae anicteric.  Abdomen soft and nontender, without splenomegaly.  Extremities without edema.  Neuro nonfocal.            Data:     Lab Results   Component Value Date    WBC 10.8 10/25/2017    HGB 12.3 (L) 10/25/2017     (L) 10/25/2017     Lab Results   Component Value Date    CR 0.62 (L) 10/25/2017    AST 25 10/25/2017    ALT 25 10/25/2017    BILITOTAL 2.2 (H) 10/25/2017       Head CT scan revealed no acute abnormalities.           Assessment and Plan:   Alcohol withdrawal delirium, on iv fluids, lorazepam, and metoprolol.  Seizure disorder, restarted on Keppra, with no definite recent seizure activity.  CML, recently responsive to imatinib, but not in a complete molecular response earlier this year. I would continue imatinib 400 mg daily, and will plan to repeat peripheral blood PCR testing for abl/bcr early  next year. If he has not achieved an adequate, deep response, we may consider switching him to dasatinib or nilotinib.      Discussed with the patient. Will order imatinib. Management per hospitalist. We can follow up as needed. Thank you.       Harjeet Lang M.D.  [facility]  10/25/2017   4:18 PM

## 2017-10-25 NOTE — H&P
Chief complaint   ten hour episode of recurrent emesis, 3 seizures , seeing land snails   on his apartment floor building [facility]     Primary M.D. Dr. Gonzalo Elise [facility]  Dr Lang in Oncology    History of present illness   Julio Nina is a 47-year-old male with a 15 year history of CML, managed successfully with Gleevec, chronic daily beer intake, recently diagnosed seizures, now on Keppra and who presents following recurrent emesis, three subsequent seizures, and with visual disturbances seeing land snails on his apartment floor. He states he had his first seizure in March ,7 months ago.Two months ago, he had a witnessed generalized seizure while at [facility] and reports that he saw Dr. Crabtree in neurology and had a brain MRI and an EEG and was placed on Keppra 500 mg twice a day.,He normally drinks 2 to 4 beers a day, sometimes more, but stopped drinking on Sunday three days ago, because he didn't have any money to buy it. Today, after getting up, he had recurrent emesis and could not keep liquids down or his medicines. Earlier this evening he had by his report three seizures.The first one he fell backward onto the bed and had generalized shaking. The second one, he was able to make it to a chair and he may have passed out and the third one was similar occurring when he was trying to get out of his bed. He also reported that the squares on his apartment floor changed into land snails that were huge and were building cities. He called the paramedics because of the three seizures. In the ER, he had marked tremors and hypertension. A head CT did not show any acute changes. His lactic acid is elevated at 6.1    Past medical history  1 CML diagnosed 15 years ago being managed by Gleevec  2 gout  3 daily alcohol use drinking 2-4 beers a day  4 recurrent generalized seizures, first one March 2017, second one 2 months ago and witnessed and started on Keppra    Review of systems  No headache no focal  weakness no fevers,has had tremors for about 2 days and some difficulty being steady when walking, he says he has these vomiting episodes that last about 10 hours and occur every couple of months no pain no shortness of breath or chest discomfort no leg swelling or claudication otherwise negative per 10 system review     Social history   He is single and worked in systems management   but has applied for disability and hasn't worked for 4 months, no smoking   Daily beer intake at 2-4     Family history   Mother had acute leukemia   Father had myocardial infarction     Physical exam   Interactive and conversant and able to give a good history   Has coarse tremors if he holds a glass of water he spills it  /90 heart rate 86 respirations 18 fever   HEENT exam mucous membranes are moist tongue has fasciculations   JVP normal carotid normal   Lungs clear to allergy no wheezing or rhonchi   CV normal heart sounds and no murmur   Abdomen firm and nontender no vomiting able to drink water without difficulty    voiding without difficulty   Neurologic no focal weakness or sensory deficit when trying to do the heel-to-shin test is legs are tremulous arms are tremulous and he spills water went to find to drink   He is not having hallucinations at this time and is oriented ×4   Skin does have macular red lesions on his chest he says is acne rosea   Musculoskeletal no joint inflammation or redness   Psychologically he is calm and cooperative     Results   Sodium 144 potassium 3.7 bicarbonate 18 creatinine 0.71   ALC 25 AST 25 magnesium 1.4   First lactic acid 6.4 second 4.0   White count 10.8 hemoglobin 12.3 platelets 120   alcohol level < 0.01   Urinalysis is normal   Head CT periventricular changes consistent with chronic small vessel ischemic disease now acute findings     Impression  Julio Nina is a 47-year-old male with a 15 year history of CML, managed successfully with Gleevec, chronic daily beer intake,  recently diagnosed seizures, now on Keppra and who presents following recurrent emesis, three subsequent seizures, and with visual disturbances, seeing land snails on his apartment floor.  Julio has evidence for alcohol withdrawal with visual hallucinations, marked tremors and increased blood pressure.His last beer intake was 3 days ago on Sunday because he didn't have any money to buy any beer .  In addition, he has had three recent seizures and likely why his lactic acid was elevated on admission. He has recently been placed on Keppra but is on a low dose and will increase it to 1000 mg twice a day.     Plan   1 admitted as an inpatient for treatment of alcohol withdrawal delirium   2  as per protocol Ativan triggered by symptoms   in addition, I will schedule oral Ativan 1 mg q. 8 hours along with daily vitamins   3 I believe that he had a brain MRI by his report and an EEG,after he saw Dr. Crabtree as an outpatient and these will not need to be repeated. The EEG by his report did not show seizure activity and the brain MRI was reported not to show anything significant   4 Check his Keppra level. He is on a low dose and I will increase it 1000 mg b.i.d.  5 Continue his daily medication for his CML ( Gleevec)  6 Will start metoprolol 50 mg twice a day for hypertension from his alcohol withdrawal  7 Lactated Ringer's 1000 cc and then LR at 150 cc an hour, both for his recent recurrent emesis and his elevated lactic acid which I believe is because of recurrent seizures.  8 Repeat lactic acid in 2 hours

## 2017-10-25 NOTE — ED PROVIDER NOTES
"  History     Chief Complaint:  Vomiting, shaking     HPI   Julio Nina is a 47 year old male with a history of seizures who presents via EMS for evaluation of shaking and vomiting. The patient notes that he is a daily drinker, consuming around 2-3 beers per day; however, he has been without alcohol for three days now. The patient states that he has been experiencing full body shaking and has been experiencing nausea and vomiting since this morning. The patient states that he feels he is hallucinating as well. The patient reports seeing \"land snails that have been building unusual things. . . They are building airplanes and people.\" He reports seeing these in his apartment. The patient denies fever, any appetite changes, or bowel or bladder movement changes. The patient is normally on Keppra, but states that he was not able to take this medication today because of his vomiting. The patient has never been told he is going through alcohol withdrawal. He notes that he also thinks that he may have had \"3 seizures\" today and states that during these episodes he fell to the ground with full body shaking, but notes that he remained conscious the entire time.     Allergies:  Penicillins--Hives     Medications:    Gleevec  Keppra    Past Medical History:    Chronic myeloid leukemia   Gout  Seizures     Past Surgical History:    History reviewed. No pertinent past surgical history.     Family History:    History reviewed. No pertinent family history.     Social History:  Marital Status: Single  Presents to the ED via EMS  Tobacco Use: Never  Alcohol Use: Yes     Review of Systems   Gastrointestinal: Positive for nausea and vomiting. Negative for constipation and diarrhea.   Genitourinary: Negative for dysuria, frequency, hematuria and urgency.   Neurological: Positive for tremors. Negative for syncope and headaches.   Psychiatric/Behavioral: Positive for hallucinations.   All other systems reviewed and are " "negative.    Physical Exam   Patient Vitals for the past 24 hrs:   BP Temp Temp src Heart Rate Resp SpO2 Height Weight   10/25/17 0445 - - - 93 - 96 % - -   10/25/17 0419 - - - 94 - 94 % - -   10/25/17 0415 (!) 161/94 - - 89 17 94 % - -   10/25/17 0400 (!) 153/91 - - 90 18 98 % - -   10/25/17 0345 (!) 165/98 - - 88 - 95 % - -   10/25/17 0330 (!) 158/97 - - 92 - 96 % - -   10/25/17 0316 - - - 95 - 96 % - -   10/25/17 0315 (!) 150/91 - - 92 - - - -   10/25/17 0300 - - - 98 - 95 % - -   10/25/17 0230 (!) 162/98 98.3  F (36.8  C) Oral 101 20 97 % 1.727 m (5' 8\") 81.6 kg (180 lb)   10/25/17 0220 - - - 101 - 95 % - -   10/25/17 0219 (!) 162/98 - - - - - - -     Physical Exam  Constitutional: The patient is oriented to person, place, and time. Alert and cooperative.  HENT:   Right Ear: External ear normal.   Left Ear: External ear normal.   Nose: Nose normal.   Mouth/Throat: Uvula is midline, oropharynx is clear and moist and mucous membranes are normal. No posterior oropharyngeal edema or erythema.   Eyes: Conjunctivae, EOM and lids are normal. Pupils are equal, round, and reactive to light.   Neck: Trachea normal. Normal range of motion. Neck supple.   Cardiovascular: tachycardia, regular rhythm, normal heart sounds, and intact distal pulses.    Pulmonary/Chest: Effort normal and breath sounds equal bilaterally. No crackles or wheezing.   Abdominal: Soft. No tenderness. No rebound and no guarding.   Musculoskeletal: Normal range of motion.  No extremity tenderness or edema.  Neurological: Alert and Oriented. Strength 5/5 in upper and lower extremities bilaterally. Sensation intact to light touch throughout. Tongue fasciculations present. Tremulousness to the bilateral upper extremities.  Skin: Skin is dry. No rash noted.          Emergency Department Course   ECG:  Indication: Alcohol withdrawal   Time: 0319  Rate 90 bpm. OH interval 134. QRS duration 88. QT/QTc 416/508.   Normal sinus rhythm. Prolonged QT. Abnormal ECG. "    No acute ST changes.  Unchanged as compared to prior, dated 03/30/2017.     Imaging:  Head CT w/o Contrast  No acute abnormality.  Report per radiology.     Radiographic findings were communicated with the patient and Admitting MD who voiced understanding of the findings.    Laboratory:  Blood:  CMP: CO2 18, Anion gap 17, Glc 196, Bilirubin 2.2, otherwise WNL (Creatinine 0.71)   CBC:  WBC 10.8, HGB 12.3,   Troponin I: <0.015   Lactic acid: 6.4  Magnesium: 1.4  INR: 1.28  EtOH: <0.01  Keppra level: pending   (0438) Lactic acid: 4.0     Interventions:  Medications   0.9% sodium chloride BOLUS (0 mLs Intravenous Stopped 10/25/17 0343)   LORazepam (ATIVAN) injection 1 mg (1 mg Intravenous Given 10/25/17 0235)   ondansetron (ZOFRAN) injection 4 mg (4 mg Intravenous Given 10/25/17 0237)   thiamine tablet 100 mg (100 mg Oral Given 10/25/17 0253)   multivitamin, therapeutic with minerals (THERA-VIT-M) tablet 1 tablet (1 tablet Oral Given 10/25/17 0253)   magnesium sulfate 2 g in NS intermittent infusion (PharMEDium or [facility] Cmpd) (0 g Intravenous Stopped 10/25/17 0457)   0.9% sodium chloride BOLUS (0 mLs Intravenous Stopped 10/25/17 0433)   LORazepam (ATIVAN) injection 1 mg (1 mg Intravenous Given 10/25/17 0346)      Emergency Department Course:  Nursing notes and vitals reviewed.  (0222) I entered the room and performed an exam of the patient as documented above.  EKG was done, interpretation as above.  A peripheral IV was established. Blood was drawn from the patient. This was sent for laboratory testing, findings above.   Findings and plan explained to the patient who consents to admission.   (5794) I discussed the patient with Dr. Finch of the hospitalist service, who will admit the patient to a cardiac telemetry bed for further monitoring, evaluation, and treatment.     Impression & Plan      CMS Diagnoses: Lactate is greater than 2 due to dehydration and alcohol withdrawal, at this time there is no sign of  sepsis.    Medical Decision Making:  Julio Nina is a 47 year old male with a history of seizure disorder and CML who presents to the ED for evaluation of shaking and vomiting. Upon presentation to the ED, the patient is nontoxic appearing. He is hypertensive and tachycardic, though his vital signs are otherwise within normal limits and stable. On exam, he is well appearing. He is alert, oriented, and neuro exam is nonfocal. He does have evidence of tongue fasciculations and tremors in the bilateral upper extremities. Aside from mil tachycardia, cardiopulmonary exam is unremarkable. Abdomen is soft and nontender throughout. The rest of his exam is as mentioned above. CT of the head was obtained and demonstrates no evidence of an acute intracranial process. ECG was obtained and demonstrates sinus rhythm. There are no concerning acute ischemic changes. Labs were obtained and are as mentioned above. Notably, he does have an elevated lactate of 6.4. Following IV fluid hydration, this improved to 4.0. The patient is afebrile and without a leukocytosis. He also has no evidence of an underlying infectious process, therefore I have low suspicion that the elevated lactate is secondary to sepsis. Overall, I do feel this is likely secondary to his dehydration and likely alcohol withdrawal. Given this patient's history and presentation, I do feel that his symptoms are most consistent with acute alcohol withdrawal. Given that he does have evidence of alcohol withdrawal and does endorse hallucinations, I feel that he should be admitted to the medicine service for further evaluation and management. This plan was discussed with the patient and he states that he understands and is in agreement with this plan. The patient was discussed with the hospitalist, she notes that she does not feel the patient needs ICU care at this time, but did agree to accept him on to her service. He will be admitted to a telemetry bed. The patient  was stable/improved at the time of admission.        Diagnosis:    ICD-10-CM   1. Alcohol withdrawal, with perceptual disturbance (H) F10.232   2. Hypomagnesemia E83.42   3. Thrombocytopenia (H) D69.6     Disposition:  Admitted to med floor     Scribe disclosure:  Carlos Manuel RICHTER, am serving as a scribe on 10/25/2017 at 2:22 AM to personally document services performed by Dr. Ortiz based on my observations and the provider's statements to me.   [facility] EMERGENCY DEPARTMENT       Kassidy Ortiz MD  10/25/17 9331

## 2017-10-25 NOTE — PLAN OF CARE
Problem: Alcohol Withdrawal Acute, Risk/Actual (Adult)  Goal: Signs and Symptoms of Listed Potential Problems Will be Absent, Minimized or Managed (Alcohol Withdrawal Acute, Risk/Actual)  Signs and symptoms of listed potential problems will be absent, minimized or managed by discharge/transition of care (reference Alcohol Withdrawal Acute, Risk/Actual (Adult) CPG).   Outcome: No Change  Pt arrived from ER at 0500, pt is shaky but is a/o, VSS, T 99.1, b/p is up a little.

## 2017-10-25 NOTE — IP AVS SNAPSHOT
[place] [address] [facility]    [address]    [facility] [address]    Phone:  [phone]    Fax:  [phone]                                       After Visit Summary   10/25/2017    Julio Nina    MRN: [id number]           After Visit Summary Signature Page     I have received my discharge instructions, and my questions have been answered. I have discussed any challenges I see with this plan with the nurse or doctor.    ..........................................................................................................................................  Patient/Patient Representative Signature      ..........................................................................................................................................  Patient Representative Print Name and Relationship to Patient    ..................................................               ................................................  Date                                            Time    ..........................................................................................................................................  Reviewed by Signature/Title    ...................................................              ..............................................  Date                                                            Time

## 2017-10-25 NOTE — CONSULTS
Neurology Consult Note  [facility]       [2017]                                                                                       Admission Date: 10/25/2017  Hospital Day: 1  Code Status: [unfilled]    Patient: Julio Nina      : 1970  MRN:  [id number]     CC:      Chief Complaint   Patient presents with     Seizures     Withdrawal       Consult Request:  Referring Provider:  MD Frannie    Indication for Consultation:     Primary Care Provider:  Gonzalo Elise MD        HPI:  Julio Nina is a 47 year old yo RH male admitted to the ICU for generalized shaking while awake/conscious, and emesis. He has been consulted on by my partner, Dr. Ezequiel Crabtree, who recently also saw him in the outpatient clinic for his epilepsy. He is prescribed Keppra for his epilepsy, but reports that he hasn't taken the medication recently due to his emesis. A Keppra level has not been drawn. He has a history of ethanol abuse, and reportedly hasn't had a drink due to lack of funds for three days prior to the onset of his shaking and emesis. His blood pressure was notably elevated in the ER, with tachycardia at the time of presentation to the ER. Blood testing in the ER documented hypomagnesemia, as well as macrocytic anemia and thrombocytopenia. He reported hallucinations at home, seeing snails building things. He reports the development of tremors after arising on the day prior to admission. He had increased tremor when attempting to stand, with symptoms or orthostatic hypotension. These episodes do not sound consistent with seizures, and more likely represent orthostatic hypotension in the setting of acute ethanol withdrawal.      BP Temp Temp src Heart Rate Resp SpO2 Height Weight   10/25/17 0415 (!) 161/94 - - 89 17 94 % - -   10/25/17 0400 (!) 153/91 - - 90 18 98 % - -   10/25/17 0345 (!) 165/98 - - 88 - 95 % - -   10/25/17 0330 (!) 158/97 - - 92 - 96 % - -  "  10/25/17 0315 (!) 150/91 - - 92 - - - -   10/25/17 0230 (!) 162/98 98.3  F Oral 101 20 97 % 1.727 m (5' 8\") 81.6 kg (180 lb)   10/25/17 0219 (!) 162/98 - - - - - - -         A complete review of symptoms was performed including vascular, infectious, cardiovascular, pulmonary, gastrointestinal, endocrinological, hematologic, dermatologic, musculoskeletal, and neurological. All were normal except as above.    CURRENT PROBLEM LIST:  Patient Active Problem List    Diagnosis Date Noted     Alcohol withdrawal delirium (H) 10/25/2017     Priority: Medium     Chronic myeloid leukemia (H) 10/11/2017     Priority: Medium     Seizures (H) 10/11/2017     Priority: Medium     History of gout 10/11/2017     Priority: Medium       PAST MEDICAL HISTORY:  ALLERGIES:   Allergies   Allergen Reactions     Penicillins Hives     Tobacco:    History   Smoking Status     Never Smoker   Smokeless Tobacco     Never Used     Alcohol:    Alcohol use Yes 12.0 oz/week 20 Cans of beer per week     MEDICATIONS:       CURRENTLY SCHEDULED MEDICATIONS     imatinib  400 mg Oral Daily     metroNIDAZOLE   Topical BID     senna-docusate  1-2 tablet Oral BID     thiamine  100 mg Oral Daily     folic acid  1 mg Oral Daily     multivitamin, therapeutic with minerals  1 tablet Oral Daily     LORazepam  1 mg Oral Q8H     levETIRAcetam  500 mg Oral BID          HOME MEDICATIONS  Prescriptions Prior to Admission   Medication Sig Dispense Refill Last Dose     imatinib (GLEEVEC) 400 MG tablet Take 1 tablet (400 mg) by mouth daily   Past Week at Unknown time     metroNIDAZOLE 0.75 % LOTN Apply 1 Film topically 2 times daily 59 mL 1 unknown     levETIRAcetam (KEPPRA) 500 MG tablet Take 1 tablet (500 mg) by mouth 2 times daily 60 tablet 0 10/24/2017 at Unknown time     MEDICAL HISTORY  Past Medical History:   Diagnosis Date     Cancer (H)     Leukemia     Seizures (H)      SURGICAL HISTORY  History reviewed. No pertinent surgical history.  FAMILY HISTORY    No " "family history on file.  SOCIAL HISTORY  Social History     Social History     Marital status: Single     Spouse name: N/A     Number of children: N/A     Years of education: N/A     Social History Main Topics     Smoking status: Never Smoker     Smokeless tobacco: Never Used     Alcohol use 12.0 oz/week     20 Cans of beer per week     Drug use: No     Sexual activity: Not Asked     Other Topics Concern     None     Social History Narrative         GENERAL EXAMINATION:    He is a middle-aged, well-developed, well-nourished man, 5' 8\" and 179 lbs 7.27 oz. Blood pressure is 170/106. Peripheral pulses are intact.  Skin examination is unremarkable. Respiratory examination is unremarkable, with rate of 22, unlabored. He is afebrile.         Height: 172.7 cm (5' 8\")     Temp: 98  F (36.7  C)   Weight: 81.4 kg (179 lb 7.3 oz)    Temp src: Oral         BP: (!) 170/106   Heart Rate: 87     Estimated body mass index is 27.29 kg/(m^2) as calculated from the following:    Height as of this encounter: 1.727 m (5' 8\").    Weight as of this encounter: 81.4 kg (179 lb 7.3 oz).    Resp: 22   SpO2: 100 %   O2 Device: None (Room air)     Blood Pressure:   BP Readings from Last 3 Encounters:   10/25/17 (!) 170/106   10/11/17 124/76   17 (!) 139/94       T24 : Temp (24hrs), Av.5  F (36.9  C), Min:98  F (36.7  C), Max:99.1  F (37.3  C)     Skin examination is unremarkable, with no edema or unusual lesions.      NEUROLOGICAL EXAMINATION  Mental Status:  He is awake, alert, and oriented X3. His speech is spontaneous and fluent. He has no aphasia or dysarthria. Short- and long-term memory are intact. He appears mildly anxious, and slightly confused at times    Station and Gait: He is restrained in bed.    Skull and Spine: His head is atraumatic.     Cranial Nerves: Visual fields are full. Extraocular movements are intact. Pursuits are smooth and saccades are of normal speed. Versions and ductions appear normal. He has no " nystagmus. His pupils are poorly reactive. His face is symmetric at rest and with movement. He has no ptosis. His tongue is midline. Shoulder shrug is symmetric. Hearing is intact.    Motor: Muscle bulk is preserved. He has no focal muscle atrophy. Muscle tone is normal. Individual muscle testing shows no focal or fatigable weakness proximally or distally in his arms or legs.     Sensory: Sensation is symmetric in his arms and legs.     Coordination: He has no resting, postural, or action tremor at this time.       .  LABORATORY RESULTS      SMA-7:  Recent Labs   Lab Test  10/25/17   0810  10/25/17   0300  10/11/17   1712  08/26/17   1222   NA  140  144  140  135   POTASSIUM  3.6  3.7  3.8  2.7*   CHLORIDE  107  109  106  96   CO2  25  18*  23  24   GLC  176*  196*  110*  198*   BUN  10  11  6*  8   CR  0.62*  0.71  0.89  0.82   AC  8.1*  8.6  9.1  8.7     Recent Labs   Lab Test  10/25/17   1150  10/25/17   0300   MAG  3.2*  1.4*     No results for input(s): PHOS in the last 42372 hours.0.81 mg (actual weight)  CMP:    Recent Labs  Lab 10/25/17  0300   PROTTOTAL 7.1   ALBUMIN 3.9   ALKPHOS 62   AST 25   ALT 25   BILITOTAL 2.2*     CBC:      Recent Labs  Lab 10/25/17  0300   WBC 10.8   RBC 3.50*   HGB 12.3*   HCT 36.7*   *   *     Recent Labs   Lab Test  10/11/17   1713   COLOR  Yellow   APPEARANCE  Clear   URINEGLC  Negative   URINEBILI  Negative   URINEKETONE  Negative   SG  <=1.005   UBLD  Negative   URINEPH  6.5   PROTEIN  Negative   UROBILINOGEN  0.2   NITRITE  Negative   LEUKEST  Negative   RBCU  O - 2   WBCU  O - 2     No results found for: MICROALBUMIN, CREATCONC    Cholesterol Panel: Lab Results   Component Value Date    CHOL 175 10/11/2017    HDL 38 (L) 10/11/2017     (H) 10/11/2017    TRIG 156 (H) 10/11/2017     Lipase:   Lab Results   Component Value Date    LIPASE 330 03/30/2017     HgA1c:   Hemoglobin A1C   Date Value Ref Range Status   03/30/2017 7.5 (H) 4.3 - 6.0 % Final     TSH:  No results for input(s): TSH in the last 168 hours.  Vitamin B12: No lab results found.  Vitamin D: No results for input(s): VITDT in the last 168 hours.  RPR: @LABALLVALUES(RPR:1)@  ESR: No lab results found.  CRP: No results found for: CRP    Recent Labs  Lab 10/25/17  0300   TROPI <0.015     Lab Results   Component Value Date    TROPI <0.015 10/25/2017    TROPI  03/30/2017     <0.015  The 99th percentile for upper reference range is 0.045 ug/L.  Troponin values in   the range of 0.045 - 0.120 ug/L may be associated with risks of adverse   clinical events.      TROPI  03/30/2017     <0.015  The 99th percentile for upper reference range is 0.045 ug/L.  Troponin values in   the range of 0.045 - 0.120 ug/L may be associated with risks of adverse   clinical events.        INR:    Recent Labs  Lab 10/25/17  0300   INR 1.28*     ABG:   Recent Labs  Lab 10/25/17  0810   O2PER Room Air     Blood Cultures: No results for input(s): CULT in the last 168 hours.    Keppra  Level:    No lab results found.    Ethanol Level: <0.01 in the ER.    IMAGING RESULTS   Head Ct W/o Contrast    Result Date: 10/25/2017  CT HEAD W/O CONTRAST 10/25/2017 4:35 AM  HISTORY: Hallucinations. Seizures. Vomiting. TECHNIQUE: Routine noncontrast head CT. Radiation dose for this scan was reduced using automated exposure control, adjustment of the mA and/or kV according to patient size, or iterative reconstruction technique. COMPARISON: 8/26/2017. FINDINGS: There is mild generalized brain atrophy. No mass, mass effect or intracranial hemorrhage. No evidence of acute infarct. Periventricular low attenuation is consistent with chronic small vessel ischemic disease. The visualized paranasal sinuses are clear.     IMPRESSION:  No acute abnormality. RONNELL MOYER MD      Recent Results (from the past 24 hour(s))   Head CT w/o contrast    Narrative    CT HEAD W/O CONTRAST 10/25/2017 4:35 AM      HISTORY: Hallucinations. Seizures. Vomiting.    TECHNIQUE:  Routine noncontrast head CT. Radiation dose for this scan  was reduced using automated exposure control, adjustment of the mA  and/or kV according to patient size, or iterative reconstruction  technique.    COMPARISON: 8/26/2017.    FINDINGS: There is mild generalized brain atrophy. No mass, mass  effect or intracranial hemorrhage. No evidence of acute infarct.   Periventricular low attenuation is consistent with chronic small  vessel ischemic disease. The visualized paranasal sinuses are clear.      Impression    IMPRESSION:  No acute abnormality.    RONNELL MOYER MD       CXR:    _____________________________________________________________________________    EKG:        ASSESSMENT     1. Emesis, tremors, and symptoms (e.g., hallucinations) consistent with acute ethanol withdrawal.  2. Probable syncope/presyncope related to hypovolemia/orthostatic hypotension. His spells are not consistent with seizures.  3. Macrocytic anemia. No B12 testing available in the [facility] EMR.      RECOMMENDATIONS   1. I recommend he continue the previously prescribed Keppra 500 mg po bid. I suggest a level be tested on his admission blood draw.  2. I don't see a need to alter his Keppra dosing at this time.  3. I suggest testing for B12 deficiency: B12, folate, homocysteine, and methylmalonic acid.  4. Continue supportive care, including observation/treatment for ethanol withdrawal.    I will inform Dr. Crabtree that his patient has been admitted to the ICU.    Please call if there are further questions. Thank you.      Girma Vaughan M.D., Ph.D.    [facility]

## 2017-10-25 NOTE — PROGRESS NOTES
Patient seen and examined.  Chart reviewed.  Please see admission H&P by Dr. Finch from earlier today for details.

## 2017-10-26 NOTE — PROGRESS NOTES
Hematology/Oncology Progress Note    Chart check.  Patient appears to be doing well. Restarted imatinib yesterday. Drop in his WBC from 10,800 to 3,600 today noted. Doubt this is related to imatinib. He may have a mild baseline leukopenia due to EtOH use and/or Keppra, with transient leukocytosis yesterday from stress. Would continue imatinib and follow his CBC.     Harjeet Lang M.D.  [facility]  10/26/2017   12:20 PM

## 2017-10-26 NOTE — PROGRESS NOTES
[facility]  Hospitalist Progress Note  Name: Julio Nina    MRN: [id number]  Provider:  Osmin Ni MD  10/26/17    Initial presenting complaint/issue to hospital (Diagnosis): seizures likely related to ETOH w/d.         Assessment and Plan:      Summary of Stay: Julio Nina is a 47 year old male admitted on 10/25/2017 with seizures. Hx of ETOH abuse last drink on Friday, CML on gleevac.      Problem List:     1. ETOH w/d and likely related seizures.  - CT head no acute process.  - On CIWA protocol.    2. Seizure d/o.  - On keppra.    3. CML.  - On gleevac.    DVT Prophylaxis:  -  PCD's.  Code Status: Full Code  Discharge Dispo: home.  Estimated Disch Date / # of Days until Discharge: 1 day.        Interval History:        No fevers/chills/chest pain/SOB/N/V.                  Physical Exam:      Last Vital Signs:  Temp: 98.2  F (36.8  C) Temp src: Oral BP: (!) 138/92   Heart Rate: 86 Resp: 16 SpO2: 98 % O2 Device: None (Room air)      Intake/Output Summary (Last 24 hours) at 10/26/17 0752  Last data filed at 10/26/17 0634   Gross per 24 hour   Intake             3480 ml   Output              550 ml   Net             2930 ml     I/O last 3 completed shifts:  In: 3480 [P.O.:720; I.V.:2760]  Out: 550 [Urine:550]  Vitals:    10/25/17 0230 10/25/17 0523 10/26/17 0400   Weight: 81.6 kg (180 lb) 81.4 kg (179 lb 7.3 oz) 82.6 kg (182 lb 1.6 oz)       Gen - AAO x 3 in NAD.  Lungs - CTA B.  Heart - RR,S1+S2 nml, no m/g/r.  Abd - soft, NT, ND, + BS.  Ext - no edema.  Neuro - CN II-XII wnl, MONTEMAYOR's.         Medications:      All current medications were reviewed.         Data:      All new lab and imaging data was reviewed.   Labs:  No results for input(s): CULT in the last 168 hours.    Recent Labs  Lab 10/26/17  0555 10/25/17  0300   WBC 3.6* 10.8   HGB 12.3* 12.3*   HCT 35.6* 36.7*   * 105*   PLT 86* 120*       Recent Labs  Lab 10/26/17  0555 10/25/17  1150 10/25/17  0810 10/25/17  0300   NA  139  --  140 144   POTASSIUM 2.9*  --  3.6 3.7   CHLORIDE 105  --  107 109   CO2 28  --  25 18*   ANIONGAP 6  --  8 17*   *  --  176* 196*   BUN 6*  --  10 11   CR 0.61*  --  0.62* 0.71   GFRESTIMATED >90  --  >90 >90   GFRESTBLACK >90  --  >90 >90   AC 8.1*  --  8.1* 8.6   MAG  --  3.2*  --  1.4*   PROTTOTAL  --   --   --  7.1   ALBUMIN  --   --   --  3.9   BILITOTAL  --   --   --  2.2*   ALKPHOS  --   --   --  62   AST  --   --   --  25   ALT  --   --   --  25      Recent Imaging:   No results found for this or any previous visit (from the past 24 hour(s)).

## 2017-10-26 NOTE — PLAN OF CARE
ICU End of Shift Summary.  For vital signs and complete assessments, please see documentation flowsheets.     Pertinent assessments: Anxious/impulsive at times. CIWA 4. Still off on date, otherwise oriented. More steady on his feet this morning, but still a little shaky. Using call light appropriately this AM. VSS, lungs clear RA heart regular. Tolerating regular diet. 2 BMs yesterday evening, none this morning. Voiding.   Major Shift Events: 1st dose chemo drug administered yesterday evening. Started chemo precautions. Pt's sister James came to visit and wrote down contact numbers for herself and other family members, copy placed in chart.   Plan (Upcoming Events): Chem dep.   Discharge/Transfer Needs: May transfer to tele floor.    Bedside Shift Report Completed :   Bedside Safety Check Completed:

## 2017-10-26 NOTE — PROGRESS NOTES
Pt transferred to [facility] via WC escorted by LPN.  Report given to CRISTAL Clemens. Pt belongings with pt at time of transfer. Pt oriented to room and call light,  see flowsheet and MAR for additional assessment information.

## 2017-10-26 NOTE — PLAN OF CARE
"Problem: Patient Care Overview  Goal: Plan of Care/Patient Progress Review  Outcome: Improving  ICU End of Shift Summary.  For vital signs and complete assessments, please see documentation flowsheets.      Pertinent assessments: More oriented today. CIWA low 3-8. Slight tremors/little unsteady on feet but improved. Slightly impulsive. Multiple loose stools, C-diff pending. Feels \"not right\" says he has body aches. Afebrile and VSS  Major Shift Events: K replaced, recheck normal. Took shower. Will get chemo medication at 2000.  Plan (Upcoming Events): chem dep to see. Further work-up if continues to feel poor. Need a d/c plan  Discharge/Transfer Needs: unknown. Need safe d/c plan and ? If pt is compliant.     Bedside Shift Report Completed    Bedside Safety Check Completed                "

## 2017-10-27 NOTE — PLAN OF CARE
Problem: Patient Care Overview  Goal: Plan of Care/Patient Progress Review  Outcome: Improving  [place]: A&O, forgetful at times  VSS: afebrile. Tele shows SR.  LS: clear on RA  GI: active, +flatus, last BM 10/26  : in BR  Skin: rash on chest and face, on metronidazole cream  Activity: SBA   Diet: reg   Pain: c/o 1/10 generalized muscle aches, taking PRN tylenol  Plan: poss discharge later today  Lab: CIWA - 0, c. diff neg

## 2017-10-27 NOTE — DISCHARGE SUMMARY
"[facility]  Discharge Summary  Name: Julio Nina    MRN: [id number]  YOB: 1970    Age: 47 year old  Date of Discharge:  10/27/2017  Date of Admission: 10/25/2017  Primary Care Provider: Gonzalo Elise  Discharge Physician:  Helen Tate MD  Discharging Service:  Hospitalist      Discharge Diagnoses:  1. Seizures  2. Acute Alcohol Withdrawal with Delirium  3. CML     Hospital Course:  Julio Nina is a 47 year old male with past medical history of seizure disorder, CML and alcohol use disorder who was admitted 10/25/2017 with nausea, emesis, seizures and visual disturbances which were felt to be due to acute alcohol withdrawal and delirium.  He was treated for alcohol withdrawal.  Neurology was consulted and recommended continuation of his Keppra and PTA dosing.  He had no further seizures while hospitalized but his Keppra level did return low (expect he was not taking Keppra regularly PTA).  He had been having visual hallucinations at home ([facility]) which resolved prior to discharge.  HE was treated for withdrawal which resolved prior to discharge.  He was seen by Chemical Dependency prior to discharge as well.     Discharge Disposition:  Discharged to home     Allergies:  Allergies   Allergen Reactions     Penicillins Hives        Condition on Discharge:  Discharge condition: Stable   Discharge vitals: Blood pressure (!) 160/101, pulse 96, temperature 98.4  F (36.9  C), temperature source Oral, resp. rate 20, height 1.727 m (5' 8\"), weight 82.6 kg (182 lb 1.6 oz), SpO2 97 %.   Code status on discharge: Full Code     History of Illness:  See detailed admission note for full details.    Physical Exam:  Blood pressure (!) 160/101, pulse 96, temperature 98.4  F (36.9  C), temperature source Oral, resp. rate 20, height 1.727 m (5' 8\"), weight 82.6 kg (182 lb 1.6 oz), SpO2 97 %.  Wt Readings from Last 1 Encounters:   10/26/17 82.6 kg (182 lb 1.6 oz)     General: " Alert, awake, no acute distress.  HEENT: Normocephalic, atraumatic, eyes anicteric and without scleral injection, EOMI, MMM.  Cardiac: RRR, normal S1, S2.  No m/g/r. No LE edema.  Pulmonary: Normal chest rise, normal work of breathing.  Lungs CTAB  Abdomen: soft, non-tender, non-distended.  Normoactive BS.  No guarding or rebound tenderness.  Extremities: no deformities.  Warm, well perfused.  Skin: mild erythematous rash on bilateral cheeks (consistent with rosacea).  Warm and Dry.  Neuro: No focal deficits noted.  Speech clear.  Coordination and strength grossly normal.  Psych: Appropriate affect. Alert and oriented x3    Procedures other than Imaging:  Telemetry monitoring     Imaging:  Results for orders placed or performed during the hospital encounter of 10/25/17   Head CT w/o contrast    Narrative    CT HEAD W/O CONTRAST 10/25/2017 4:35 AM      HISTORY: Hallucinations. Seizures. Vomiting.    TECHNIQUE: Routine noncontrast head CT. Radiation dose for this scan  was reduced using automated exposure control, adjustment of the mA  and/or kV according to patient size, or iterative reconstruction  technique.    COMPARISON: 8/26/2017.    FINDINGS: There is mild generalized brain atrophy. No mass, mass  effect or intracranial hemorrhage. No evidence of acute infarct.   Periventricular low attenuation is consistent with chronic small  vessel ischemic disease. The visualized paranasal sinuses are clear.      Impression    IMPRESSION:  No acute abnormality.    RONNELL MOYER MD        Consultations:  Consultations This Hospital Stay   CHEMICAL DEPENDENCY IP CONSULT  NEUROLOGY IP CONSULT  HEMATOLOGY & ONCOLOGY IP CONSULT     Recent Lab Results:    Recent Labs  Lab 10/27/17  0750 10/26/17  0555 10/25/17  0300   WBC 4.7 3.6* 10.8   HGB 13.7 12.3* 12.3*   HCT 39.2* 35.6* 36.7*   * 103* 105*   PLT 90* 86* 120*       Recent Labs  Lab 10/27/17  0750 10/26/17  1303 10/26/17  0555 10/25/17  1150 10/25/17  0810  10/25/17  0300     --  139  --  140 144   POTASSIUM 3.4 3.9 2.9*  --  3.6 3.7   CHLORIDE 103  --  105  --  107 109   CO2 26  --  28  --  25 18*   ANIONGAP 6  --  6  --  8 17*   *  --  106*  --  176* 196*   BUN 7  --  6*  --  10 11   CR 0.75  --  0.61*  --  0.62* 0.71   GFRESTIMATED >90  --  >90  --  >90 >90   GFRESTBLACK >90  --  >90  --  >90 >90   AC 8.7  --  8.1*  --  8.1* 8.6   MAG 1.7  --   --  3.2*  --  1.4*   PROTTOTAL  --   --   --   --   --  7.1   ALBUMIN  --   --   --   --   --  3.9   BILITOTAL  --   --   --   --   --  2.2*   ALKPHOS  --   --   --   --   --  62   AST  --   --   --   --   --  25   ALT  --   --   --   --   --  25          Pending Results:    Unresulted Labs Ordered in the Past 30 Days of this Admission     No orders found from 8/26/2017 to 10/26/2017.           Discharge Instructions and Follow-Up:   Discharge Orders     Reason for your hospital stay   You were hospitalized for seizures and alcohol withdrawal.     Follow-up and recommended labs and tests    Follow up with primary care provider, Gonzalo Elise, within 7-10 days, for hospital follow- up. No follow up labs or test are needed.     Activity   Your activity upon discharge: activity as tolerated     Full Code     Diet   Follow this diet upon discharge: Orders Placed This Encounter     Combination Diet Regular Diet Adult       Discharge Medications   Current Discharge Medication List      CONTINUE these medications which have NOT CHANGED    Details   imatinib (GLEEVEC) 400 MG tablet Take 400 mg by mouth daily Per [facility] RN: Gleevec 400mg daily was restarted by Dr. Lang on Sept 1st, 2017 (held in August for possible side effects).    Associated Diagnoses: Chronic myeloid leukemia (H)      metroNIDAZOLE 0.75 % LOTN Apply 1 Film topically 2 times daily  Qty: 59 mL, Refills: 1    Associated Diagnoses: Rosacea      levETIRAcetam (KEPPRA) 500 MG tablet Take 1 tablet (500 mg) by mouth 2 times daily  Qty: 60  tablet, Refills: 0           Time Spent on this Encounter   I, Helen Tate, personally saw the patient today and spent greater than 30 minutes discharging this patient.    Helen Tate MD

## 2017-10-27 NOTE — PLAN OF CARE
Problem: Patient Care Overview  Goal: Plan of Care/Patient Progress Review  Outcome: No Change  [place]: A&O x4. Forgetful.  VSS: tele shows SR.  LS: clear on RA  GI: active, +flatus, last BM 10/26  : in BR  Skin: rash on face, chest, intact  Activity: SBA   Diet: reg   Pain: c/o 1/10 generalized muscle aches, taking tylenol  Lab: CIWA score 1.

## 2017-10-27 NOTE — DISCHARGE INSTRUCTIONS
1. Completely abstain from alcohol.  2. Take a multivitamin once daily.  3. Follow up with your primary care doctor in the next 7-10 days.    CM: To   Schedule a CD assessment call [phone]

## 2017-10-27 NOTE — PROGRESS NOTES
Pt discharging home with family provided transportation. IV removed, dressing c/d/i. Belongings packed and sent with patient. Per MD, ok to discharge with Chem [facility] telephone number to set up appointment, Pt showed understanding and willingness to call. Discharge instructions reviewed with patient, questions answered. Chem Dep number provided and highlighted.

## 2017-11-16 NOTE — PROGRESS NOTES
.  SUBJECTIVE:   Julio Nina is a 47 year old male who presents to clinic today for the following health issues:          Hospital Follow-up Visit:    Hospital/Nursing Home/IP Rehab Facility: [facility]  Date of Admission: 10-25-17  Date of Discharge: 10-27-17  Reason(s) for Admission: seizures, alcohol withdrawals    Alcohol level was zero at admission   Drank a six pack about a few days prior - and had not drank for a month prior   Does not feel he has an alcohol issues     Beers 2 per day - not every day - has not drank since hospital any alcohol and plans to quit drinking     2 grand mal seizures -April then late July - and started medication keppra  Mid afternoon both of them occured  No precipitating factors   Was eating a sand which at lunch, and second one was at [employer] a check   Started taking keppra after second seizure     MRI did not show anything   CT head ok   EEG showed no seizure activity, even provoked prior to second seizure     Dr Ezequiel Crabtree - neurology     Has had emesis more often in past year   Has been taking Gleevec for past 15 years, about   Stopped for 2 weeks and did not change emesis   Now that has resolved     Has not thrown up in a month except when was in hospital 10/25/2017  Was slowing up over past 3-6 months   Can be fine and then threw up - no warning when it happens   Gets nauseated and then throws up   Of note his liver tests were elevated when he was having emesis and improved when not throwing up  Bilirubin high   Consider GI consult if emesis recurs       Takes gleevec at bedtime as he gets nauseated and flushed and dizzy and helped to take at bedtime    Dr Lang oncology     Bowels and bladder are normal     Not sleeping well related to joint pain    Chronic gout per rheum   Taking prednisone daily - decreased from 20 mg to 15 mg daily      Got laid off   Was at work and smells were making him sick  Was on disability for nausea and vomiting -  applied for long term - appealed twice   4 month unpaid leave of absence          Job 24/ 7 365 days a year   On call all the time  Senior                Problems taking medications regularly:  None       Medication changes since discharge: None       Problems adhering to non-medication therapy:  None    Summary of hospitalization:  [facility] discharge summary reviewed  Diagnostic Tests/Treatments reviewed.  Follow up needed: oncology   Other Healthcare Providers Involved in Patient s Care:         Specialist appointment - oncology   Update since discharge: improved.     Post Discharge Medication Reconciliation: discharge medications reconciled and changed, per note/orders (see AVS).  Plan of care communicated with patient     Coding guidelines for this visit:  Type of Medical   Decision Making Face-to-Face Visit       within 7 Days of discharge Face-to-Face Visit        within 14 days of discharge   Moderate Complexity 23972 [id number]   High Complexity 53931 [id number]                  Problem list and histories reviewed & adjusted, as indicated.  Additional history: as documented    Patient Active Problem List   Diagnosis     Chronic myeloid leukemia (H)     Seizures (H)     History of gout     Alcohol withdrawal delirium (H)     Nausea and vomiting, intractability of vomiting not specified, unspecified vomiting type     Past Surgical History:   Procedure Laterality Date     NO HISTORY OF SURGERY         Social History   Substance Use Topics     Smoking status: Never Smoker     Smokeless tobacco: Never Used     Alcohol use 8.4 oz/week     14 Cans of beer per week     Family History   Problem Relation Age of Onset     Leukemia Mother      AML     HEART DISEASE Father      double bypass     DIABETES Father      insulin treated      Breast Cancer Maternal Aunt              Reviewed and updated as needed this visit by clinical staff  Tobacco  Allergies  Meds  Med Hx  Surg Hx  Fam Hx  Soc Hx     "  Reviewed and updated as needed this visit by Provider         ROS:  Constitutional, HEENT, cardiovascular, pulmonary, GI, , musculoskeletal, neuro, skin, endocrine and psych systems are negative, except as otherwise noted.      OBJECTIVE:   /70 (BP Location: Left arm, Patient Position: Sitting, Cuff Size: Adult Large)  Pulse 82  Temp 98.2  F (36.8  C) (Oral)  Ht 5' 8\" (1.727 m)  Wt 201 lb (91.2 kg)  SpO2 98%  BMI 30.56 kg/m2  Body mass index is 30.56 kg/(m^2).  GENERAL:  alert and no distress  RESP: lungs clear to auscultation - no rales, rhonchi or wheezes  CV: regular rate and rhythm, normal S1 S2, no S3 or S4, no murmur, click or rub, mild peripheral edema - gleevec related per patient   ABDOMEN: soft, nontender, no hepatosplenomegaly, no masses and bowel sounds normal  MS: no gross musculoskeletal defects noted, no edema  NEURO: Normal strength and tone, mentation intact and speech normal  PSYCH: mentation appears normal, affect normal/bright    Diagnostic Test Results:  Reviewed ER and admission notes     ASSESSMENT/PLAN:     (C92.10) Chronic myeloid leukemia (H)  (primary encounter diagnosis)  Comment: on chronic gleevec  Plan: sees Dr Lang     (R56.9) Seizures (H)  Comment: no seizure on keppra   Plan: seeing Dr Crabtree - has appointment in future     (Z87.39) History of gout  Comment: seeing rheum   Plan: follow up with rheum   Prednisone per rheum     (R11.2) Nausea and vomiting, intractability of vomiting not specified, unspecified vomiting type  Comment: currently resolved   Plan: if recurs will see GI                 Patient Instructions   Make appointment to see neurology as planned     Appointment with oncology as planned     If nausea persists or vomiting restarts would have you see gastroenterology       JAZMYNE Ko [facility]  "

## 2017-11-16 NOTE — NURSING NOTE
"Chief Complaint   Patient presents with     Hospital F/U       Initial /70 (BP Location: Left arm, Patient Position: Sitting, Cuff Size: Adult Large)  Pulse 82  Temp 98.2  F (36.8  C) (Oral)  Ht 5' 8\" (1.727 m)  Wt 201 lb (91.2 kg)  SpO2 98%  BMI 30.56 kg/m2 Estimated body mass index is 30.56 kg/(m^2) as calculated from the following:    Height as of this encounter: 5' 8\" (1.727 m).    Weight as of this encounter: 201 lb (91.2 kg).  Medication Reconciliation: complete    "

## 2017-11-16 NOTE — MR AVS SNAPSHOT
"              After Visit Summary   2017    Julio Nina    MRN: [id number]           Patient Information     Date Of Birth          1970        Visit Information        Provider Department      2017 5:20 PM Jesenia Carter APRN CNP [facility]        Care Instructions    Make appointment to see neurology as planned     Appointment with oncology as planned     If nausea persists or vomiting restarts would have you see gastroenterology           Follow-ups after your visit        Who to contact     If you have questions or need follow up information about today's clinic visit or your schedule please contact [facility] directly at [phone].  Normal or non-critical lab and imaging results will be communicated to you by [employer], letter or phone within 4 business days after the clinic has received the results. If you do not hear from us within 7 days, please contact the clinic through [employer] or phone. If you have a critical or abnormal lab result, we will notify you by phone as soon as possible.  Submit refill requests through [employer] or call your pharmacy and they will forward the refill request to us. Please allow 3 business days for your refill to be completed.          Additional Information About Your Visit        MyChart Information     [employer] lets you send messages to your doctor, view your test results, renew your prescriptions, schedule appointments and more. To sign up, go to [email or web address] . Click on \"Log in\" on the left side of the screen, which will take you to the Welcome page. Then click on \"Sign up Now\" on the right side of the page.     You will be asked to enter the access code listed below, as well as some personal information. Please follow the directions to create your username and password.     Your access code is: [email or web address]  Expires: 2017  2:33 PM     Your access code will  in 90 days. If you need help or a " "new code, please call your [facility] clinic or [phone].        Care EveryWhere ID     This is your Care EveryWhere ID. This could be used by other organizations to access your [facility] medical records  [id number]        Your Vitals Were     Pulse Temperature Height Pulse Oximetry BMI (Body Mass Index)       82 98.2  F (36.8  C) (Oral) 5' 8\" (1.727 m) 98% 30.56 kg/m2        Blood Pressure from Last 3 Encounters:   11/16/17 118/70   10/27/17 (!) (P) 150/93   10/11/17 124/76    Weight from Last 3 Encounters:   11/16/17 201 lb (91.2 kg)   10/26/17 182 lb 1.6 oz (82.6 kg)   10/11/17 200 lb (90.7 kg)              Today, you had the following     No orders found for display       Primary Care Provider Office Phone # Fax #    Gonzalo Elise [phone]       [facility] [address] [place] [place] [address]        Equal Access to Services     [facility]: Hadii aad ku hadasho Soomaali, waaxda luqadaha, qaybta kaalmada adeegyadivina, jorge ham . So [facility] [phone].    ATENCIÓN: Si habla español, tiene a rocha disposición servicios gratuitos de asistencia lingüística. Llame al [phone].    We comply with applicable federal civil rights laws and [place] laws. We do not discriminate on the basis of race, color, national origin, age, disability, sex, sexual orientation, or gender identity.            Thank you!     Thank you for choosing [facility]  for your care. Our goal is always to provide you with excellent care. Hearing back from our patients is one way we can continue to improve our services. Please take a few minutes to complete the written survey that you may receive in the mail after your visit with us. Thank you!             Your Updated Medication List - Protect others around you: Learn how to safely use, store and throw away your medicines at www.disposemymeds.org.          This list is accurate as of: 11/16/17  6:25 PM.  Always use " your most recent med list.                   Brand Name Dispense Instructions for use Diagnosis    GLEEVEC 400 MG tablet   Generic drug:  imatinib      Take 400 mg by mouth daily Per [facility] RN: Gleevec 400mg daily was restarted by Dr. Lang on Sept 1st, 2017 (held in August for possible side effects).    Chronic myeloid leukemia (H)       levETIRAcetam 500 MG tablet    KEPPRA    60 tablet    Take 1 tablet (500 mg) by mouth 2 times daily        PREDNISONE PO      Take 15 mg by mouth daily

## 2017-11-17 NOTE — PATIENT INSTRUCTIONS
Make appointment to see neurology as planned     Appointment with oncology as planned     If nausea persists or vomiting restarts would have you see gastroenterology

## 2022-10-04 PROBLEM — R11.2 NAUSEA AND VOMITING: Status: ACTIVE | Noted: 2017-01-01
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
